# Patient Record
Sex: MALE | Race: WHITE
[De-identification: names, ages, dates, MRNs, and addresses within clinical notes are randomized per-mention and may not be internally consistent; named-entity substitution may affect disease eponyms.]

---

## 2017-05-23 ENCOUNTER — PRIOR ORIGINAL RECORDS (OUTPATIENT)
Dept: OTHER | Age: 77
End: 2017-05-23

## 2017-07-03 ENCOUNTER — OFFICE VISIT (OUTPATIENT)
Dept: INTERNAL MEDICINE CLINIC | Facility: CLINIC | Age: 77
End: 2017-07-03

## 2017-07-03 VITALS
BODY MASS INDEX: 27.05 KG/M2 | SYSTOLIC BLOOD PRESSURE: 138 MMHG | OXYGEN SATURATION: 96 % | HEART RATE: 81 BPM | RESPIRATION RATE: 17 BRPM | TEMPERATURE: 98 F | DIASTOLIC BLOOD PRESSURE: 76 MMHG | HEIGHT: 65 IN | WEIGHT: 162.38 LBS

## 2017-07-03 DIAGNOSIS — I10 HTN, GOAL BELOW 130/80: Primary | ICD-10-CM

## 2017-07-03 DIAGNOSIS — E78.5 DYSLIPIDEMIA: ICD-10-CM

## 2017-07-03 DIAGNOSIS — E78.2 MIXED HYPERLIPIDEMIA: ICD-10-CM

## 2017-07-03 DIAGNOSIS — K43.9 VENTRAL HERNIA WITHOUT OBSTRUCTION OR GANGRENE: ICD-10-CM

## 2017-07-03 PROCEDURE — 99204 OFFICE O/P NEW MOD 45 MIN: CPT | Performed by: INTERNAL MEDICINE

## 2017-07-03 RX ORDER — LOSARTAN POTASSIUM 100 MG/1
25 TABLET ORAL 2 TIMES DAILY
Qty: 60 TABLET | Refills: 6 | Status: SHIPPED | OUTPATIENT
Start: 2017-07-03 | End: 2017-08-07

## 2017-07-03 NOTE — PROGRESS NOTES
HPI:    Patient ID: Nohemi Mccracken is a 69year old male. HPIPt here for getting established. Has htn and  PVCs. Sees dr Jayjay Bloom. Has non obstructing CAD. Has a very large ventral hernia - no surgical opinion.     Review of Systems   Constitutional: Knute Pro edema. Neurological: He is alert and oriented to person, place, and time. Skin: No rash noted. No erythema.    Psychiatric:   forgetful              ASSESSMENT/PLAN:   Htn, goal below 130/80  (primary encounter diagnosis) controlled with with Losartan 1

## 2017-07-07 ENCOUNTER — NURSE ONLY (OUTPATIENT)
Dept: INTERNAL MEDICINE CLINIC | Facility: CLINIC | Age: 77
End: 2017-07-07

## 2017-07-07 DIAGNOSIS — K43.9 VENTRAL HERNIA WITHOUT OBSTRUCTION OR GANGRENE: ICD-10-CM

## 2017-07-07 DIAGNOSIS — E78.5 DYSLIPIDEMIA: ICD-10-CM

## 2017-07-07 DIAGNOSIS — I10 HTN, GOAL BELOW 130/80: ICD-10-CM

## 2017-07-07 LAB
ALBUMIN SERPL BCP-MCNC: 4 G/DL (ref 3.5–4.8)
ALBUMIN/GLOB SERPL: 1.7 {RATIO} (ref 1–2)
ALP SERPL-CCNC: 60 U/L (ref 32–100)
ALT SERPL-CCNC: 15 U/L (ref 17–63)
ANION GAP SERPL CALC-SCNC: 7 MMOL/L (ref 0–18)
AST SERPL-CCNC: 26 U/L (ref 15–41)
BASOPHILS # BLD: 0 K/UL (ref 0–0.2)
BASOPHILS NFR BLD: 1 %
BILIRUB SERPL-MCNC: 1.5 MG/DL (ref 0.3–1.2)
BUN SERPL-MCNC: 5 MG/DL (ref 8–20)
BUN/CREAT SERPL: 7 (ref 10–20)
CALCIUM SERPL-MCNC: 8.9 MG/DL (ref 8.5–10.5)
CHLORIDE SERPL-SCNC: 98 MMOL/L (ref 95–110)
CHOLEST SERPL-MCNC: 88 MG/DL (ref 110–200)
CO2 SERPL-SCNC: 25 MMOL/L (ref 22–32)
CREAT SERPL-MCNC: 0.71 MG/DL (ref 0.5–1.5)
EOSINOPHIL # BLD: 0.2 K/UL (ref 0–0.7)
EOSINOPHIL NFR BLD: 6 %
ERYTHROCYTE [DISTWIDTH] IN BLOOD BY AUTOMATED COUNT: 15.1 % (ref 11–15)
GLOBULIN PLAS-MCNC: 2.4 G/DL (ref 2.5–3.7)
GLUCOSE SERPL-MCNC: 95 MG/DL (ref 70–99)
HCT VFR BLD AUTO: 37.4 % (ref 41–52)
HDLC SERPL-MCNC: 39 MG/DL
HGB BLD-MCNC: 13.2 G/DL (ref 13.5–17.5)
LDLC SERPL CALC-MCNC: 41 MG/DL (ref 0–99)
LYMPHOCYTES # BLD: 1.6 K/UL (ref 1–4)
LYMPHOCYTES NFR BLD: 40 %
MCH RBC QN AUTO: 29.9 PG (ref 27–32)
MCHC RBC AUTO-ENTMCNC: 35.2 G/DL (ref 32–37)
MCV RBC AUTO: 84.9 FL (ref 80–100)
MONOCYTES # BLD: 1.1 K/UL (ref 0–1)
MONOCYTES NFR BLD: 27 %
NEUTROPHILS # BLD AUTO: 1 K/UL (ref 1.8–7.7)
NEUTROPHILS NFR BLD: 25 %
NONHDLC SERPL-MCNC: 49 MG/DL
OSMOLALITY UR CALC.SUM OF ELEC: 267 MOSM/KG (ref 275–295)
PLATELET # BLD AUTO: 137 K/UL (ref 140–400)
PMV BLD AUTO: 10.7 FL (ref 7.4–10.3)
POTASSIUM SERPL-SCNC: 4.9 MMOL/L (ref 3.3–5.1)
PROT SERPL-MCNC: 6.4 G/DL (ref 5.9–8.4)
RBC # BLD AUTO: 4.41 M/UL (ref 4.5–5.9)
SODIUM SERPL-SCNC: 130 MMOL/L (ref 136–144)
TRIGL SERPL-MCNC: 42 MG/DL (ref 1–149)
TSH SERPL-ACNC: 2.32 UIU/ML (ref 0.45–5.33)
WBC # BLD AUTO: 4 K/UL (ref 4–11)

## 2017-07-07 PROCEDURE — 80053 COMPREHEN METABOLIC PANEL: CPT | Performed by: INTERNAL MEDICINE

## 2017-07-07 PROCEDURE — 84443 ASSAY THYROID STIM HORMONE: CPT | Performed by: INTERNAL MEDICINE

## 2017-07-07 PROCEDURE — 85025 COMPLETE CBC W/AUTO DIFF WBC: CPT | Performed by: INTERNAL MEDICINE

## 2017-07-07 PROCEDURE — 80061 LIPID PANEL: CPT | Performed by: INTERNAL MEDICINE

## 2017-07-07 PROCEDURE — 36415 COLL VENOUS BLD VENIPUNCTURE: CPT | Performed by: INTERNAL MEDICINE

## 2017-07-07 NOTE — PROGRESS NOTES
Pt presented to clinic today for blood draw. Per physician able to draw orders. Orders  documented within chart. Pt tolerated lab draw well.  verified.   Orders drawn include: cbc, cmp, lipid, tsh   Site of draw: rt arm   Gareth Courts, [facility]

## 2017-07-18 ENCOUNTER — LAB ENCOUNTER (OUTPATIENT)
Dept: LAB | Age: 77
End: 2017-07-18
Attending: INTERNAL MEDICINE
Payer: MEDICARE

## 2017-07-18 DIAGNOSIS — E78.2 MIXED HYPERLIPIDEMIA: Primary | ICD-10-CM

## 2017-07-18 DIAGNOSIS — I10 ESSENTIAL HYPERTENSION, MALIGNANT: ICD-10-CM

## 2017-07-18 LAB
ALT SERPL-CCNC: 15 U/L (ref 17–63)
ANION GAP SERPL CALC-SCNC: 8 MMOL/L (ref 0–18)
AST SERPL-CCNC: 21 U/L (ref 15–41)
BUN SERPL-MCNC: 10 MG/DL (ref 8–20)
BUN/CREAT SERPL: 15.6 (ref 10–20)
CALCIUM SERPL-MCNC: 8.6 MG/DL (ref 8.5–10.5)
CHLORIDE SERPL-SCNC: 100 MMOL/L (ref 95–110)
CHOLEST SERPL-MCNC: 86 MG/DL (ref 110–200)
CO2 SERPL-SCNC: 24 MMOL/L (ref 22–32)
CREAT SERPL-MCNC: 0.64 MG/DL (ref 0.5–1.5)
GLUCOSE SERPL-MCNC: 100 MG/DL (ref 70–99)
HDLC SERPL-MCNC: 41 MG/DL
LDLC SERPL CALC-MCNC: 37 MG/DL (ref 0–99)
NONHDLC SERPL-MCNC: 45 MG/DL
OSMOLALITY UR CALC.SUM OF ELEC: 273 MOSM/KG (ref 275–295)
POTASSIUM SERPL-SCNC: 4.2 MMOL/L (ref 3.3–5.1)
SODIUM SERPL-SCNC: 132 MMOL/L (ref 136–144)
TRIGL SERPL-MCNC: 41 MG/DL (ref 1–149)

## 2017-07-18 PROCEDURE — 84460 ALANINE AMINO (ALT) (SGPT): CPT

## 2017-07-18 PROCEDURE — 36415 COLL VENOUS BLD VENIPUNCTURE: CPT

## 2017-07-18 PROCEDURE — 80061 LIPID PANEL: CPT

## 2017-07-18 PROCEDURE — 84450 TRANSFERASE (AST) (SGOT): CPT

## 2017-07-18 PROCEDURE — 80048 BASIC METABOLIC PNL TOTAL CA: CPT

## 2017-07-19 ENCOUNTER — PRIOR ORIGINAL RECORDS (OUTPATIENT)
Dept: OTHER | Age: 77
End: 2017-07-19

## 2017-07-19 LAB
ALT (SGPT): 15 U/L
AST (SGOT): 21 U/L
BUN: 10 MG/DL
CALCIUM: 21 MG/DL
CHLORIDE: 100 MEQ/L
CHOLESTEROL, TOTAL: 86 MG/DL
CREATININE, SERUM: 0.64 MG/DL
GLUCOSE: 100 MG/DL
GLUCOSE: 100 MG/DL
HDL CHOLESTEROL: 41 MG/DL
LDL CHOLESTEROL: 37 MG/DL
POTASSIUM, SERUM: 4.2 MEQ/L
SGOT (AST): 21 IU/L
SGPT (ALT): 15 IU/L
SODIUM: 132 MEQ/L
TRIGLYCERIDES: 41 MG/DL

## 2017-08-07 ENCOUNTER — PRIOR ORIGINAL RECORDS (OUTPATIENT)
Dept: OTHER | Age: 77
End: 2017-08-07

## 2017-08-07 RX ORDER — LOSARTAN POTASSIUM 100 MG/1
25 TABLET ORAL 2 TIMES DAILY
Qty: 60 TABLET | Refills: 6 | Status: ON HOLD | OUTPATIENT
Start: 2017-08-07 | End: 2018-01-01

## 2017-08-14 ENCOUNTER — PRIOR ORIGINAL RECORDS (OUTPATIENT)
Dept: OTHER | Age: 77
End: 2017-08-14

## 2017-08-15 ENCOUNTER — HOSPITAL ENCOUNTER (OUTPATIENT)
Dept: CV DIAGNOSTICS | Facility: HOSPITAL | Age: 77
Discharge: HOME | End: 2017-08-15
Attending: INTERNAL MEDICINE
Payer: MEDICARE

## 2017-08-15 ENCOUNTER — HOSPITAL ENCOUNTER (OUTPATIENT)
Dept: NUCLEAR MEDICINE | Facility: HOSPITAL | Age: 77
Discharge: HOME | End: 2017-08-15
Attending: INTERNAL MEDICINE
Payer: MEDICARE

## 2017-08-15 DIAGNOSIS — I25.10 CORONARY ATHEROSCLEROSIS OF NATIVE CORONARY ARTERY: ICD-10-CM

## 2017-08-15 DIAGNOSIS — I49.1 SUPRAVENTRICULAR PREMATURE BEATS: ICD-10-CM

## 2017-08-15 DIAGNOSIS — I49.3 VENTRICULAR PREMATURE BEATS: ICD-10-CM

## 2017-08-15 PROCEDURE — 93017 CV STRESS TEST TRACING ONLY: CPT | Performed by: INTERNAL MEDICINE

## 2017-08-15 PROCEDURE — 93016 CV STRESS TEST SUPVJ ONLY: CPT | Performed by: INTERNAL MEDICINE

## 2017-08-15 PROCEDURE — 78452 HT MUSCLE IMAGE SPECT MULT: CPT | Performed by: INTERNAL MEDICINE

## 2017-08-15 PROCEDURE — 93227 XTRNL ECG REC<48 HR R&I: CPT | Performed by: INTERNAL MEDICINE

## 2017-08-15 PROCEDURE — 93018 CV STRESS TEST I&R ONLY: CPT | Performed by: INTERNAL MEDICINE

## 2017-08-15 RX ORDER — SODIUM CHLORIDE 9 MG/ML
INJECTION, SOLUTION INTRAVENOUS
Status: DISPENSED
Start: 2017-08-15 | End: 2017-08-16

## 2017-08-15 NOTE — IMAGING NOTE
Pt here for a mod. Yehuda stress test as ordered by Dr Brian Sherman. Upon obtaining baseline EKG's, Pt had 5 beats of non-sustained VT and freq. Multifocal PVC's at rest. Dr Ferrera Rater, notified and test was changed to a regadenoson instead.  Pt is asymptomatic and den

## 2017-08-16 ENCOUNTER — HOSPITAL ENCOUNTER (OUTPATIENT)
Dept: CV DIAGNOSTICS | Facility: HOSPITAL | Age: 77
Discharge: HOME | End: 2017-08-16
Attending: INTERNAL MEDICINE
Payer: MEDICARE

## 2017-08-18 ENCOUNTER — PRIOR ORIGINAL RECORDS (OUTPATIENT)
Dept: OTHER | Age: 77
End: 2017-08-18

## 2017-08-24 ENCOUNTER — PRIOR ORIGINAL RECORDS (OUTPATIENT)
Dept: OTHER | Age: 77
End: 2017-08-24

## 2017-09-05 ENCOUNTER — PRIOR ORIGINAL RECORDS (OUTPATIENT)
Dept: OTHER | Age: 77
End: 2017-09-05

## 2018-01-01 ENCOUNTER — HOSPITAL ENCOUNTER (OUTPATIENT)
Dept: GENERAL RADIOLOGY | Facility: HOSPITAL | Age: 78
Discharge: HOME | End: 2018-01-01
Attending: SPECIALIST
Payer: COMMERCIAL

## 2018-01-01 ENCOUNTER — APPOINTMENT (OUTPATIENT)
Dept: GENERAL RADIOLOGY | Facility: HOSPITAL | Age: 78
DRG: 178 | End: 2018-01-01
Attending: HOSPITALIST
Payer: MEDICARE

## 2018-01-01 ENCOUNTER — APPOINTMENT (OUTPATIENT)
Dept: CV DIAGNOSTICS | Facility: HOSPITAL | Age: 78
DRG: 178 | End: 2018-01-01
Attending: HOSPITALIST
Payer: MEDICARE

## 2018-01-01 ENCOUNTER — SNF VISIT (OUTPATIENT)
Dept: INTERNAL MEDICINE CLINIC | Facility: SKILLED NURSING FACILITY | Age: 78
End: 2018-01-01

## 2018-01-01 ENCOUNTER — SNF/IP PROF CHARGE ONLY (OUTPATIENT)
Dept: INTERNAL MEDICINE CLINIC | Facility: CLINIC | Age: 78
End: 2018-01-01

## 2018-01-01 ENCOUNTER — APPOINTMENT (OUTPATIENT)
Dept: GENERAL RADIOLOGY | Facility: HOSPITAL | Age: 78
DRG: 563 | End: 2018-01-01
Attending: INTERNAL MEDICINE
Payer: MEDICARE

## 2018-01-01 ENCOUNTER — APPOINTMENT (OUTPATIENT)
Dept: CT IMAGING | Facility: HOSPITAL | Age: 78
DRG: 563 | End: 2018-01-01
Attending: HOSPITALIST
Payer: MEDICARE

## 2018-01-01 ENCOUNTER — APPOINTMENT (OUTPATIENT)
Dept: GENERAL RADIOLOGY | Facility: HOSPITAL | Age: 78
DRG: 178 | End: 2018-01-01
Attending: EMERGENCY MEDICINE
Payer: MEDICARE

## 2018-01-01 ENCOUNTER — APPOINTMENT (OUTPATIENT)
Dept: GENERAL RADIOLOGY | Facility: HOSPITAL | Age: 78
DRG: 563 | End: 2018-01-01
Attending: EMERGENCY MEDICINE
Payer: MEDICARE

## 2018-01-01 ENCOUNTER — APPOINTMENT (OUTPATIENT)
Dept: ULTRASOUND IMAGING | Facility: HOSPITAL | Age: 78
DRG: 178 | End: 2018-01-01
Attending: INTERNAL MEDICINE
Payer: MEDICARE

## 2018-01-01 ENCOUNTER — HOSPITAL ENCOUNTER (OUTPATIENT)
Dept: CT IMAGING | Facility: HOSPITAL | Age: 78
Discharge: HOME | End: 2018-01-01
Attending: INTERNAL MEDICINE
Payer: MEDICARE

## 2018-01-01 ENCOUNTER — APPOINTMENT (OUTPATIENT)
Dept: CT IMAGING | Facility: HOSPITAL | Age: 78
DRG: 563 | End: 2018-01-01
Attending: EMERGENCY MEDICINE
Payer: MEDICARE

## 2018-01-01 ENCOUNTER — TELEPHONE (OUTPATIENT)
Dept: INTERNAL MEDICINE CLINIC | Facility: CLINIC | Age: 78
End: 2018-01-01

## 2018-01-01 ENCOUNTER — HOSPITAL ENCOUNTER (INPATIENT)
Facility: HOSPITAL | Age: 78
LOS: 8 days | Discharge: NURSING, REHAB OR LONG-TERM CARE | DRG: 178 | End: 2018-01-01
Attending: EMERGENCY MEDICINE | Admitting: HOSPITALIST
Payer: MEDICARE

## 2018-01-01 ENCOUNTER — HOSPITAL ENCOUNTER (INPATIENT)
Facility: HOSPITAL | Age: 78
LOS: 9 days | Discharge: NURSING, REHAB OR LONG-TERM CARE | DRG: 563 | End: 2018-01-01
Attending: EMERGENCY MEDICINE | Admitting: INTERNAL MEDICINE
Payer: MEDICARE

## 2018-01-01 VITALS
WEIGHT: 155.63 LBS | DIASTOLIC BLOOD PRESSURE: 55 MMHG | HEART RATE: 94 BPM | SYSTOLIC BLOOD PRESSURE: 111 MMHG | RESPIRATION RATE: 20 BRPM | TEMPERATURE: 97 F | BODY MASS INDEX: 24 KG/M2 | OXYGEN SATURATION: 97 %

## 2018-01-01 VITALS
BODY MASS INDEX: 24 KG/M2 | SYSTOLIC BLOOD PRESSURE: 97 MMHG | TEMPERATURE: 98 F | HEART RATE: 106 BPM | WEIGHT: 155.63 LBS | RESPIRATION RATE: 20 BRPM | OXYGEN SATURATION: 97 % | DIASTOLIC BLOOD PRESSURE: 56 MMHG

## 2018-01-01 VITALS
TEMPERATURE: 98 F | RESPIRATION RATE: 20 BRPM | HEART RATE: 73 BPM | WEIGHT: 155.63 LBS | OXYGEN SATURATION: 97 % | BODY MASS INDEX: 24 KG/M2 | SYSTOLIC BLOOD PRESSURE: 129 MMHG | DIASTOLIC BLOOD PRESSURE: 74 MMHG

## 2018-01-01 VITALS
WEIGHT: 161.31 LBS | HEART RATE: 79 BPM | DIASTOLIC BLOOD PRESSURE: 60 MMHG | BODY MASS INDEX: 25.32 KG/M2 | SYSTOLIC BLOOD PRESSURE: 117 MMHG | RESPIRATION RATE: 18 BRPM | TEMPERATURE: 99 F | OXYGEN SATURATION: 96 % | HEIGHT: 67 IN

## 2018-01-01 VITALS
SYSTOLIC BLOOD PRESSURE: 130 MMHG | DIASTOLIC BLOOD PRESSURE: 65 MMHG | RESPIRATION RATE: 20 BRPM | WEIGHT: 158 LBS | BODY MASS INDEX: 25 KG/M2 | OXYGEN SATURATION: 97 % | HEART RATE: 94 BPM | TEMPERATURE: 98 F

## 2018-01-01 VITALS
HEART RATE: 110 BPM | WEIGHT: 155 LBS | OXYGEN SATURATION: 97 % | BODY MASS INDEX: 24 KG/M2 | RESPIRATION RATE: 20 BRPM | DIASTOLIC BLOOD PRESSURE: 65 MMHG | SYSTOLIC BLOOD PRESSURE: 97 MMHG | TEMPERATURE: 97 F

## 2018-01-01 VITALS
SYSTOLIC BLOOD PRESSURE: 92 MMHG | HEART RATE: 96 BPM | RESPIRATION RATE: 16 BRPM | TEMPERATURE: 97 F | DIASTOLIC BLOOD PRESSURE: 53 MMHG

## 2018-01-01 VITALS
WEIGHT: 155.63 LBS | DIASTOLIC BLOOD PRESSURE: 72 MMHG | SYSTOLIC BLOOD PRESSURE: 151 MMHG | RESPIRATION RATE: 20 BRPM | TEMPERATURE: 98 F | HEART RATE: 82 BPM | OXYGEN SATURATION: 97 % | BODY MASS INDEX: 24 KG/M2

## 2018-01-01 VITALS
SYSTOLIC BLOOD PRESSURE: 106 MMHG | HEIGHT: 66 IN | OXYGEN SATURATION: 96 % | TEMPERATURE: 98 F | WEIGHT: 150.88 LBS | RESPIRATION RATE: 18 BRPM | DIASTOLIC BLOOD PRESSURE: 51 MMHG | HEART RATE: 94 BPM | BODY MASS INDEX: 24.25 KG/M2

## 2018-01-01 VITALS
DIASTOLIC BLOOD PRESSURE: 66 MMHG | TEMPERATURE: 97 F | BODY MASS INDEX: 24 KG/M2 | RESPIRATION RATE: 20 BRPM | HEART RATE: 110 BPM | OXYGEN SATURATION: 97 % | WEIGHT: 155 LBS | SYSTOLIC BLOOD PRESSURE: 93 MMHG

## 2018-01-01 VITALS
SYSTOLIC BLOOD PRESSURE: 125 MMHG | HEART RATE: 74 BPM | OXYGEN SATURATION: 97 % | RESPIRATION RATE: 20 BRPM | DIASTOLIC BLOOD PRESSURE: 65 MMHG | WEIGHT: 185.63 LBS | BODY MASS INDEX: 29 KG/M2 | TEMPERATURE: 98 F

## 2018-01-01 DIAGNOSIS — R07.89 PRESSURE IN CHEST: ICD-10-CM

## 2018-01-01 DIAGNOSIS — S00.83XA CONTUSION OF FACE, INITIAL ENCOUNTER: ICD-10-CM

## 2018-01-01 DIAGNOSIS — S42.211A CLOSED DISPLACED FRACTURE OF SURGICAL NECK OF RIGHT HUMERUS, UNSPECIFIED FRACTURE MORPHOLOGY, INITIAL ENCOUNTER: Primary | ICD-10-CM

## 2018-01-01 DIAGNOSIS — E87.1 HYPONATREMIA: ICD-10-CM

## 2018-01-01 DIAGNOSIS — R26.2 DIFFICULTY WALKING: ICD-10-CM

## 2018-01-01 DIAGNOSIS — R06.02 SOB (SHORTNESS OF BREATH): ICD-10-CM

## 2018-01-01 DIAGNOSIS — E44.1 MILD PROTEIN-CALORIE MALNUTRITION (HCC): Primary | ICD-10-CM

## 2018-01-01 DIAGNOSIS — S00.83XD CONTUSION OF FACE, SUBSEQUENT ENCOUNTER: ICD-10-CM

## 2018-01-01 DIAGNOSIS — S42.211A CLOSED DISPLACED FRACTURE OF SURGICAL NECK OF RIGHT HUMERUS, UNSPECIFIED FRACTURE MORPHOLOGY, INITIAL ENCOUNTER: ICD-10-CM

## 2018-01-01 DIAGNOSIS — R53.1 GENERALIZED WEAKNESS: ICD-10-CM

## 2018-01-01 DIAGNOSIS — S00.81XD ABRASION OF FACE, SUBSEQUENT ENCOUNTER: ICD-10-CM

## 2018-01-01 DIAGNOSIS — S42.211D CLOSED DISPLACED FRACTURE OF SURGICAL NECK OF RIGHT HUMERUS WITH ROUTINE HEALING, UNSPECIFIED FRACTURE MORPHOLOGY, SUBSEQUENT ENCOUNTER: ICD-10-CM

## 2018-01-01 DIAGNOSIS — I77.810 DILATED AORTIC ROOT (HCC): ICD-10-CM

## 2018-01-01 DIAGNOSIS — F41.9 ANXIETY: ICD-10-CM

## 2018-01-01 DIAGNOSIS — R55 SYNCOPE AND COLLAPSE: ICD-10-CM

## 2018-01-01 DIAGNOSIS — R53.1 WEAKNESS: ICD-10-CM

## 2018-01-01 DIAGNOSIS — S09.8XXA BLUNT HEAD INJURY, INITIAL ENCOUNTER: ICD-10-CM

## 2018-01-01 DIAGNOSIS — R41.82 ALTERED MENTAL STATUS, UNSPECIFIED ALTERED MENTAL STATUS TYPE: ICD-10-CM

## 2018-01-01 DIAGNOSIS — R60.0 LOCALIZED EDEMA: ICD-10-CM

## 2018-01-01 DIAGNOSIS — R26.2 UNABLE TO WALK: ICD-10-CM

## 2018-01-01 DIAGNOSIS — S00.81XA ABRASION OF FACE, INITIAL ENCOUNTER: ICD-10-CM

## 2018-01-01 DIAGNOSIS — R60.9 EDEMA, UNSPECIFIED TYPE: ICD-10-CM

## 2018-01-01 DIAGNOSIS — Z02.9 DISCHARGE PLANNING ISSUES: ICD-10-CM

## 2018-01-01 DIAGNOSIS — M25.562 LEFT KNEE PAIN: ICD-10-CM

## 2018-01-01 DIAGNOSIS — F32.A DEPRESSION, UNSPECIFIED DEPRESSION TYPE: ICD-10-CM

## 2018-01-01 DIAGNOSIS — S42.309A HUMERUS FRACTURE: ICD-10-CM

## 2018-01-01 DIAGNOSIS — M25.561 RIGHT KNEE PAIN: ICD-10-CM

## 2018-01-01 DIAGNOSIS — R29.898 LEFT ARM WEAKNESS: ICD-10-CM

## 2018-01-01 DIAGNOSIS — E46 PROTEIN-CALORIE MALNUTRITION, UNSPECIFIED SEVERITY (HCC): Primary | ICD-10-CM

## 2018-01-01 DIAGNOSIS — I95.9 HYPOTENSION, UNSPECIFIED HYPOTENSION TYPE: ICD-10-CM

## 2018-01-01 DIAGNOSIS — S42.411A RIGHT SUPRACONDYLAR HUMERUS FRACTURE: ICD-10-CM

## 2018-01-01 DIAGNOSIS — Z86.718 HX OF BLOOD CLOTS: ICD-10-CM

## 2018-01-01 DIAGNOSIS — R94.31 ABNORMAL EKG: ICD-10-CM

## 2018-01-01 DIAGNOSIS — J18.9 HOSPITAL-ACQUIRED PNEUMONIA: Primary | ICD-10-CM

## 2018-01-01 DIAGNOSIS — E87.5 HYPERKALEMIA: ICD-10-CM

## 2018-01-01 DIAGNOSIS — R53.1 GENERAL WEAKNESS: ICD-10-CM
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
## 2018-01-01 DIAGNOSIS — D68.9 COAGULOPATHY (HCC): ICD-10-CM

## 2018-01-01 DIAGNOSIS — D64.9 ANEMIA, UNSPECIFIED TYPE: ICD-10-CM

## 2018-01-01 DIAGNOSIS — E44.1 MILD PROTEIN-CALORIE MALNUTRITION (HCC): ICD-10-CM

## 2018-01-01 LAB
ALBUMIN SERPL BCP-MCNC: 2.2 G/DL (ref 3.5–4.8)
ALBUMIN SERPL BCP-MCNC: 2.2 G/DL (ref 3.5–4.8)
ALBUMIN SERPL BCP-MCNC: 2.5 G/DL (ref 3.5–4.8)
ALBUMIN SERPL BCP-MCNC: 2.6 G/DL (ref 3.5–4.8)
ALBUMIN/GLOB SERPL: 1.1 {RATIO} (ref 1–2)
ALP SERPL-CCNC: 64 U/L (ref 32–100)
ALT SERPL-CCNC: 13 U/L (ref 17–63)
ANION GAP SERPL CALC-SCNC: 10 MMOL/L (ref 0–18)
ANION GAP SERPL CALC-SCNC: 2 MMOL/L (ref 0–18)
ANION GAP SERPL CALC-SCNC: 2 MMOL/L (ref 0–18)
ANION GAP SERPL CALC-SCNC: 3 MMOL/L (ref 0–18)
ANION GAP SERPL CALC-SCNC: 3 MMOL/L (ref 0–18)
ANION GAP SERPL CALC-SCNC: 4 MMOL/L (ref 0–18)
ANION GAP SERPL CALC-SCNC: 5 MMOL/L (ref 0–18)
ANION GAP SERPL CALC-SCNC: 6 MMOL/L (ref 0–18)
ANION GAP SERPL CALC-SCNC: 7 MMOL/L (ref 0–18)
ANION GAP SERPL CALC-SCNC: 7 MMOL/L (ref 0–18)
ANION GAP SERPL CALC-SCNC: 9 MMOL/L (ref 0–18)
ANTIBODY SCREEN: NEGATIVE
APTT PPP: 40.8 SECONDS (ref 23.2–35.3)
APTT PPP: 56.3 SECONDS (ref 23.2–35.3)
APTT PPP: 71.3 SECONDS (ref 23.2–35.3)
AST SERPL-CCNC: 20 U/L (ref 15–41)
BASOPHILS # BLD: 0 K/UL (ref 0–0.2)
BASOPHILS # BLD: 0.1 K/UL (ref 0–0.2)
BASOPHILS # BLD: 0.2 K/UL (ref 0–0.2)
BASOPHILS # BLD: 0.2 K/UL (ref 0–0.2)
BASOPHILS # BLD: 0.3 K/UL (ref 0–0.2)
BASOPHILS NFR BLD: 0 %
BASOPHILS NFR BLD: 1 %
BASOPHILS NFR BLD: 2 %
BASOPHILS NFR BLD: 3 %
BASOPHILS NFR BLD: 4 %
BASOPHILS NFR BLD: 6 %
BILIRUB SERPL-MCNC: 1.1 MG/DL (ref 0.3–1.2)
BILIRUB UR QL: NEGATIVE
BILIRUB UR QL: NEGATIVE
BLOOD TYPE BARCODE: 6200
BNP SERPL-MCNC: 216 PG/ML (ref 0–100)
BNP SERPL-MCNC: 433 PG/ML (ref 0–100)
BUN SERPL-MCNC: 11 MG/DL (ref 8–20)
BUN SERPL-MCNC: 11 MG/DL (ref 8–20)
BUN SERPL-MCNC: 13 MG/DL (ref 8–20)
BUN SERPL-MCNC: 14 MG/DL (ref 8–20)
BUN SERPL-MCNC: 14 MG/DL (ref 8–20)
BUN SERPL-MCNC: 15 MG/DL (ref 8–20)
BUN SERPL-MCNC: 16 MG/DL (ref 8–20)
BUN SERPL-MCNC: 18 MG/DL (ref 8–20)
BUN SERPL-MCNC: 18 MG/DL (ref 8–20)
BUN SERPL-MCNC: 21 MG/DL (ref 8–20)
BUN SERPL-MCNC: 23 MG/DL (ref 8–20)
BUN SERPL-MCNC: 24 MG/DL (ref 8–20)
BUN SERPL-MCNC: 25 MG/DL (ref 8–20)
BUN SERPL-MCNC: 26 MG/DL (ref 8–20)
BUN SERPL-MCNC: 26 MG/DL (ref 8–20)
BUN SERPL-MCNC: 27 MG/DL (ref 8–20)
BUN SERPL-MCNC: 28 MG/DL (ref 8–20)
BUN SERPL-MCNC: 32 MG/DL (ref 8–20)
BUN/CREAT SERPL: 15.5 (ref 10–20)
BUN/CREAT SERPL: 16.4 (ref 10–20)
BUN/CREAT SERPL: 18.7 (ref 10–20)
BUN/CREAT SERPL: 19.8 (ref 10–20)
BUN/CREAT SERPL: 21.3 (ref 10–20)
BUN/CREAT SERPL: 21.5 (ref 10–20)
BUN/CREAT SERPL: 21.7 (ref 10–20)
BUN/CREAT SERPL: 21.7 (ref 10–20)
BUN/CREAT SERPL: 21.9 (ref 10–20)
BUN/CREAT SERPL: 22.4 (ref 10–20)
BUN/CREAT SERPL: 23.1 (ref 10–20)
BUN/CREAT SERPL: 23.5 (ref 10–20)
BUN/CREAT SERPL: 24.3 (ref 10–20)
BUN/CREAT SERPL: 24.3 (ref 10–20)
BUN/CREAT SERPL: 24.6 (ref 10–20)
BUN/CREAT SERPL: 27.6 (ref 10–20)
BUN/CREAT SERPL: 27.7 (ref 10–20)
BUN/CREAT SERPL: 28.3 (ref 10–20)
BUN/CREAT SERPL: 28.6 (ref 10–20)
BUN/CREAT SERPL: 29.3 (ref 10–20)
BUN/CREAT SERPL: 29.5 (ref 10–20)
BUN/CREAT SERPL: 30.8 (ref 10–20)
BUN/CREAT SERPL: 32.4 (ref 10–20)
CALCIUM SERPL-MCNC: 7.6 MG/DL (ref 8.5–10.5)
CALCIUM SERPL-MCNC: 7.7 MG/DL (ref 8.5–10.5)
CALCIUM SERPL-MCNC: 7.8 MG/DL (ref 8.5–10.5)
CALCIUM SERPL-MCNC: 8 MG/DL (ref 8.5–10.5)
CALCIUM SERPL-MCNC: 8.1 MG/DL (ref 8.5–10.5)
CALCIUM SERPL-MCNC: 8.4 MG/DL (ref 8.5–10.5)
CALCIUM SERPL-MCNC: 8.4 MG/DL (ref 8.5–10.5)
CALCIUM SERPL-MCNC: 8.5 MG/DL (ref 8.5–10.5)
CHLORIDE SERPL-SCNC: 100 MMOL/L (ref 95–110)
CHLORIDE SERPL-SCNC: 101 MMOL/L (ref 95–110)
CHLORIDE SERPL-SCNC: 102 MMOL/L (ref 95–110)
CHLORIDE SERPL-SCNC: 103 MMOL/L (ref 95–110)
CHLORIDE SERPL-SCNC: 103 MMOL/L (ref 95–110)
CHLORIDE SERPL-SCNC: 104 MMOL/L (ref 95–110)
CHLORIDE SERPL-SCNC: 97 MMOL/L (ref 95–110)
CHLORIDE SERPL-SCNC: 98 MMOL/L (ref 95–110)
CHLORIDE SERPL-SCNC: 98 MMOL/L (ref 95–110)
CHLORIDE SERPL-SCNC: 99 MMOL/L (ref 95–110)
CK SERPL-CCNC: 32 U/L (ref 49–397)
CO2 SERPL-SCNC: 16 MMOL/L (ref 22–32)
CO2 SERPL-SCNC: 18 MMOL/L (ref 22–32)
CO2 SERPL-SCNC: 20 MMOL/L (ref 22–32)
CO2 SERPL-SCNC: 20 MMOL/L (ref 22–32)
CO2 SERPL-SCNC: 21 MMOL/L (ref 22–32)
CO2 SERPL-SCNC: 22 MMOL/L (ref 22–32)
CO2 SERPL-SCNC: 23 MMOL/L (ref 22–32)
CO2 SERPL-SCNC: 23 MMOL/L (ref 22–32)
CO2 SERPL-SCNC: 24 MMOL/L (ref 22–32)
CO2 SERPL-SCNC: 25 MMOL/L (ref 22–32)
CO2 SERPL-SCNC: 26 MMOL/L (ref 22–32)
COLOR UR: YELLOW
COLOR UR: YELLOW
CORTIS SERPL-MCNC: 16.8 MCG/DL
CREAT SERPL-MCNC: 0.6 MG/DL (ref 0.5–1.5)
CREAT SERPL-MCNC: 0.61 MG/DL (ref 0.5–1.5)
CREAT SERPL-MCNC: 0.65 MG/DL (ref 0.5–1.5)
CREAT SERPL-MCNC: 0.67 MG/DL (ref 0.5–1.5)
CREAT SERPL-MCNC: 0.68 MG/DL (ref 0.5–1.5)
CREAT SERPL-MCNC: 0.71 MG/DL (ref 0.5–1.5)
CREAT SERPL-MCNC: 0.73 MG/DL (ref 0.5–1.5)
CREAT SERPL-MCNC: 0.74 MG/DL (ref 0.5–1.5)
CREAT SERPL-MCNC: 0.75 MG/DL (ref 0.5–1.5)
CREAT SERPL-MCNC: 0.76 MG/DL (ref 0.5–1.5)
CREAT SERPL-MCNC: 0.81 MG/DL (ref 0.5–1.5)
CREAT SERPL-MCNC: 0.91 MG/DL (ref 0.5–1.5)
CREAT SERPL-MCNC: 0.92 MG/DL (ref 0.5–1.5)
CREAT SERPL-MCNC: 0.99 MG/DL (ref 0.5–1.5)
CREAT SERPL-MCNC: 1.03 MG/DL (ref 0.5–1.5)
CREAT SERPL-MCNC: 1.08 MG/DL (ref 0.5–1.5)
CREAT SERPL-MCNC: 1.12 MG/DL (ref 0.5–1.5)
CREAT SERPL-MCNC: 1.15 MG/DL (ref 0.5–1.5)
CREAT SERPL-MCNC: 1.16 MG/DL (ref 0.5–1.5)
CREAT SERPL-MCNC: 1.2 MG/DL (ref 0.5–1.5)
D DIMER PPP FEU-MCNC: 1.79 MCG/ML (ref ?–0.77)
EOSINOPHIL # BLD: 0 K/UL (ref 0–0.7)
EOSINOPHIL # BLD: 0 K/UL (ref 0–0.7)
EOSINOPHIL # BLD: 0.1 K/UL (ref 0–0.7)
EOSINOPHIL # BLD: 0.2 K/UL (ref 0–0.7)
EOSINOPHIL # BLD: 0.3 K/UL (ref 0–0.7)
EOSINOPHIL # BLD: 0.4 K/UL (ref 0–0.7)
EOSINOPHIL NFR BLD: 0 %
EOSINOPHIL NFR BLD: 0 %
EOSINOPHIL NFR BLD: 1 %
EOSINOPHIL NFR BLD: 2 %
EOSINOPHIL NFR BLD: 3 %
EOSINOPHIL NFR BLD: 3 %
EOSINOPHIL NFR BLD: 4 %
EOSINOPHIL NFR BLD: 5 %
EOSINOPHIL NFR BLD: 5 %
EOSINOPHIL NFR BLD: 7 %
ERYTHROCYTE [DISTWIDTH] IN BLOOD BY AUTOMATED COUNT: 15.1 % (ref 11–15)
ERYTHROCYTE [DISTWIDTH] IN BLOOD BY AUTOMATED COUNT: 15.2 % (ref 11–15)
ERYTHROCYTE [DISTWIDTH] IN BLOOD BY AUTOMATED COUNT: 15.3 % (ref 11–15)
ERYTHROCYTE [DISTWIDTH] IN BLOOD BY AUTOMATED COUNT: 15.4 % (ref 11–15)
ERYTHROCYTE [DISTWIDTH] IN BLOOD BY AUTOMATED COUNT: 15.4 % (ref 11–15)
ERYTHROCYTE [DISTWIDTH] IN BLOOD BY AUTOMATED COUNT: 15.5 % (ref 11–15)
ERYTHROCYTE [DISTWIDTH] IN BLOOD BY AUTOMATED COUNT: 15.6 % (ref 11–15)
ERYTHROCYTE [DISTWIDTH] IN BLOOD BY AUTOMATED COUNT: 15.7 % (ref 11–15)
ERYTHROCYTE [DISTWIDTH] IN BLOOD BY AUTOMATED COUNT: 16.5 % (ref 11–15)
ERYTHROCYTE [DISTWIDTH] IN BLOOD BY AUTOMATED COUNT: 16.6 % (ref 11–15)
ERYTHROCYTE [DISTWIDTH] IN BLOOD BY AUTOMATED COUNT: 16.8 % (ref 11–15)
ERYTHROCYTE [DISTWIDTH] IN BLOOD BY AUTOMATED COUNT: 16.9 % (ref 11–15)
ERYTHROCYTE [DISTWIDTH] IN BLOOD BY AUTOMATED COUNT: 16.9 % (ref 11–15)
ERYTHROCYTE [DISTWIDTH] IN BLOOD BY AUTOMATED COUNT: 17 % (ref 11–15)
ERYTHROCYTE [DISTWIDTH] IN BLOOD BY AUTOMATED COUNT: 17 % (ref 11–15)
ERYTHROCYTE [DISTWIDTH] IN BLOOD BY AUTOMATED COUNT: 17.2 % (ref 11–15)
ERYTHROCYTE [DISTWIDTH] IN BLOOD BY AUTOMATED COUNT: 17.2 % (ref 11–15)
ETHANOL SERPL-MCNC: 176 MG/DL
FERRITIN SERPL IA-MCNC: 950 NG/ML (ref 24–336)
GLOBULIN PLAS-MCNC: 2.4 G/DL (ref 2.5–3.7)
GLUCOSE BLDC GLUCOMTR-MCNC: 121 MG/DL (ref 70–99)
GLUCOSE BLDC GLUCOMTR-MCNC: 90 MG/DL (ref 70–99)
GLUCOSE SERPL-MCNC: 103 MG/DL (ref 70–99)
GLUCOSE SERPL-MCNC: 105 MG/DL (ref 70–99)
GLUCOSE SERPL-MCNC: 106 MG/DL (ref 70–99)
GLUCOSE SERPL-MCNC: 108 MG/DL (ref 70–99)
GLUCOSE SERPL-MCNC: 108 MG/DL (ref 70–99)
GLUCOSE SERPL-MCNC: 112 MG/DL (ref 70–99)
GLUCOSE SERPL-MCNC: 112 MG/DL (ref 70–99)
GLUCOSE SERPL-MCNC: 116 MG/DL (ref 70–99)
GLUCOSE SERPL-MCNC: 123 MG/DL (ref 70–99)
GLUCOSE SERPL-MCNC: 88 MG/DL (ref 70–99)
GLUCOSE SERPL-MCNC: 93 MG/DL (ref 70–99)
GLUCOSE SERPL-MCNC: 94 MG/DL (ref 70–99)
GLUCOSE SERPL-MCNC: 96 MG/DL (ref 70–99)
GLUCOSE SERPL-MCNC: 97 MG/DL (ref 70–99)
GLUCOSE SERPL-MCNC: 99 MG/DL (ref 70–99)
GLUCOSE SERPL-MCNC: 99 MG/DL (ref 70–99)
GLUCOSE UR-MCNC: NEGATIVE MG/DL
GLUCOSE UR-MCNC: NEGATIVE MG/DL
HCT VFR BLD AUTO: 20.9 % (ref 41–52)
HCT VFR BLD AUTO: 21.5 % (ref 41–52)
HCT VFR BLD AUTO: 21.6 % (ref 41–52)
HCT VFR BLD AUTO: 22.1 % (ref 41–52)
HCT VFR BLD AUTO: 22.1 % (ref 41–52)
HCT VFR BLD AUTO: 22.3 % (ref 41–52)
HCT VFR BLD AUTO: 22.5 % (ref 41–52)
HCT VFR BLD AUTO: 24.8 % (ref 41–52)
HCT VFR BLD AUTO: 26 % (ref 41–52)
HCT VFR BLD AUTO: 26 % (ref 41–52)
HCT VFR BLD AUTO: 26.8 % (ref 41–52)
HCT VFR BLD AUTO: 27.1 % (ref 41–52)
HCT VFR BLD AUTO: 27.5 % (ref 41–52)
HCT VFR BLD AUTO: 27.6 % (ref 41–52)
HCT VFR BLD AUTO: 28.2 % (ref 41–52)
HCT VFR BLD AUTO: 30.6 % (ref 41–52)
HCT VFR BLD AUTO: 31.4 % (ref 41–52)
HCT VFR BLD AUTO: 31.5 % (ref 41–52)
HGB BLD-MCNC: 10.6 G/DL (ref 13.5–17.5)
HGB BLD-MCNC: 10.9 G/DL (ref 13.5–17.5)
HGB BLD-MCNC: 10.9 G/DL (ref 13.5–17.5)
HGB BLD-MCNC: 7 G/DL (ref 13.5–17.5)
HGB BLD-MCNC: 7.3 G/DL (ref 13.5–17.5)
HGB BLD-MCNC: 7.4 G/DL (ref 13.5–17.5)
HGB BLD-MCNC: 7.5 G/DL (ref 13.5–17.5)
HGB BLD-MCNC: 7.6 G/DL (ref 13.5–17.5)
HGB BLD-MCNC: 8.7 G/DL (ref 13.5–17.5)
HGB BLD-MCNC: 8.8 G/DL (ref 13.5–17.5)
HGB BLD-MCNC: 8.9 G/DL (ref 13.5–17.5)
HGB BLD-MCNC: 9 G/DL (ref 13.5–17.5)
HGB BLD-MCNC: 9.1 G/DL (ref 13.5–17.5)
HGB BLD-MCNC: 9.3 G/DL (ref 13.5–17.5)
HGB BLD-MCNC: 9.7 G/DL (ref 13.5–17.5)
HGB BLD-MCNC: 9.8 G/DL (ref 13.5–17.5)
INR BLD: 1.6 (ref 0.9–1.2)
INR BLD: 1.8 (ref 0.9–1.2)
IRON SATN MFR SERPL: 16 % (ref 20–55)
IRON SERPL-MCNC: 23 MCG/DL (ref 45–182)
KETONES UR-MCNC: NEGATIVE MG/DL
KETONES UR-MCNC: NEGATIVE MG/DL
LACTATE SERPL-SCNC: 1.2 MMOL/L (ref 0.5–2.2)
LYMPHOCYTES # BLD: 0.4 K/UL (ref 1–4)
LYMPHOCYTES # BLD: 0.6 K/UL (ref 1–4)
LYMPHOCYTES # BLD: 0.7 K/UL (ref 1–4)
LYMPHOCYTES # BLD: 0.7 K/UL (ref 1–4)
LYMPHOCYTES # BLD: 0.8 K/UL (ref 1–4)
LYMPHOCYTES # BLD: 0.9 K/UL (ref 1–4)
LYMPHOCYTES # BLD: 0.9 K/UL (ref 1–4)
LYMPHOCYTES # BLD: 1.2 K/UL (ref 1–4)
LYMPHOCYTES # BLD: 1.3 K/UL (ref 1–4)
LYMPHOCYTES # BLD: 1.4 K/UL (ref 1–4)
LYMPHOCYTES # BLD: 1.5 K/UL (ref 1–4)
LYMPHOCYTES # BLD: 1.5 K/UL (ref 1–4)
LYMPHOCYTES # BLD: 1.6 K/UL (ref 1–4)
LYMPHOCYTES # BLD: 1.7 K/UL (ref 1–4)
LYMPHOCYTES # BLD: 1.9 K/UL (ref 1–4)
LYMPHOCYTES NFR BLD: 11 %
LYMPHOCYTES NFR BLD: 13 %
LYMPHOCYTES NFR BLD: 14 %
LYMPHOCYTES NFR BLD: 14 %
LYMPHOCYTES NFR BLD: 15 %
LYMPHOCYTES NFR BLD: 15 %
LYMPHOCYTES NFR BLD: 17 %
LYMPHOCYTES NFR BLD: 19 %
LYMPHOCYTES NFR BLD: 19 %
LYMPHOCYTES NFR BLD: 20 %
LYMPHOCYTES NFR BLD: 21 %
LYMPHOCYTES NFR BLD: 26 %
LYMPHOCYTES NFR BLD: 27 %
LYMPHOCYTES NFR BLD: 29 %
LYMPHOCYTES NFR BLD: 4 %
LYMPHOCYTES NFR BLD: 8 %
LYMPHOCYTES NFR BLD: 9 %
MAGNESIUM SERPL-MCNC: 1.6 MG/DL (ref 1.8–2.5)
MAGNESIUM SERPL-MCNC: 1.7 MG/DL (ref 1.8–2.5)
MAGNESIUM SERPL-MCNC: 1.7 MG/DL (ref 1.8–2.5)
MAGNESIUM SERPL-MCNC: 1.8 MG/DL (ref 1.8–2.5)
MAGNESIUM SERPL-MCNC: 1.9 MG/DL (ref 1.8–2.5)
MAGNESIUM SERPL-MCNC: 1.9 MG/DL (ref 1.8–2.5)
MAGNESIUM SERPL-MCNC: 2.1 MG/DL (ref 1.8–2.5)
MCH RBC QN AUTO: 28.6 PG (ref 27–32)
MCH RBC QN AUTO: 28.7 PG (ref 27–32)
MCH RBC QN AUTO: 28.7 PG (ref 27–32)
MCH RBC QN AUTO: 28.8 PG (ref 27–32)
MCH RBC QN AUTO: 28.9 PG (ref 27–32)
MCH RBC QN AUTO: 29.2 PG (ref 27–32)
MCH RBC QN AUTO: 29.3 PG (ref 27–32)
MCH RBC QN AUTO: 29.4 PG (ref 27–32)
MCH RBC QN AUTO: 29.5 PG (ref 27–32)
MCH RBC QN AUTO: 29.5 PG (ref 27–32)
MCH RBC QN AUTO: 29.6 PG (ref 27–32)
MCH RBC QN AUTO: 30 PG (ref 27–32)
MCH RBC QN AUTO: 30.4 PG (ref 27–32)
MCH RBC QN AUTO: 30.5 PG (ref 27–32)
MCH RBC QN AUTO: 31 PG (ref 27–32)
MCHC RBC AUTO-ENTMCNC: 32.9 G/DL (ref 32–37)
MCHC RBC AUTO-ENTMCNC: 33.4 G/DL (ref 32–37)
MCHC RBC AUTO-ENTMCNC: 33.4 G/DL (ref 32–37)
MCHC RBC AUTO-ENTMCNC: 33.5 G/DL (ref 32–37)
MCHC RBC AUTO-ENTMCNC: 33.5 G/DL (ref 32–37)
MCHC RBC AUTO-ENTMCNC: 33.9 G/DL (ref 32–37)
MCHC RBC AUTO-ENTMCNC: 33.9 G/DL (ref 32–37)
MCHC RBC AUTO-ENTMCNC: 34.1 G/DL (ref 32–37)
MCHC RBC AUTO-ENTMCNC: 34.2 G/DL (ref 32–37)
MCHC RBC AUTO-ENTMCNC: 34.4 G/DL (ref 32–37)
MCHC RBC AUTO-ENTMCNC: 34.6 G/DL (ref 32–37)
MCHC RBC AUTO-ENTMCNC: 34.7 G/DL (ref 32–37)
MCHC RBC AUTO-ENTMCNC: 35.3 G/DL (ref 32–37)
MCHC RBC AUTO-ENTMCNC: 35.6 G/DL (ref 32–37)
MCHC RBC AUTO-ENTMCNC: 35.6 G/DL (ref 32–37)
MCV RBC AUTO: 84.4 FL (ref 80–100)
MCV RBC AUTO: 84.6 FL (ref 80–100)
MCV RBC AUTO: 84.9 FL (ref 80–100)
MCV RBC AUTO: 85 FL (ref 80–100)
MCV RBC AUTO: 85.2 FL (ref 80–100)
MCV RBC AUTO: 85.3 FL (ref 80–100)
MCV RBC AUTO: 85.4 FL (ref 80–100)
MCV RBC AUTO: 85.5 FL (ref 80–100)
MCV RBC AUTO: 85.7 FL (ref 80–100)
MCV RBC AUTO: 85.7 FL (ref 80–100)
MCV RBC AUTO: 85.8 FL (ref 80–100)
MCV RBC AUTO: 86 FL (ref 80–100)
MCV RBC AUTO: 86 FL (ref 80–100)
MCV RBC AUTO: 86.3 FL (ref 80–100)
MCV RBC AUTO: 86.3 FL (ref 80–100)
MCV RBC AUTO: 87.6 FL (ref 80–100)
MCV RBC AUTO: 88.8 FL (ref 80–100)
METAMYELOCYTES # BLD MANUAL: 0.05 K/UL
METAMYELOCYTES # BLD MANUAL: 0.06 K/UL
METAMYELOCYTES # BLD MANUAL: 0.1 K/UL
METAMYELOCYTES # BLD MANUAL: 0.11 K/UL
METAMYELOCYTES # BLD MANUAL: 0.15 K/UL
METAMYELOCYTES # BLD MANUAL: 0.15 K/UL
METAMYELOCYTES # BLD MANUAL: 0.16 K/UL
METAMYELOCYTES # BLD MANUAL: 0.16 K/UL
METAMYELOCYTES NFR BLD: 1 %
METAMYELOCYTES NFR BLD: 1 %
METAMYELOCYTES NFR BLD: 2 %
MONOCYTES # BLD: 0.2 K/UL (ref 0–1)
MONOCYTES # BLD: 0.3 K/UL (ref 0–1)
MONOCYTES # BLD: 0.4 K/UL (ref 0–1)
MONOCYTES # BLD: 0.4 K/UL (ref 0–1)
MONOCYTES # BLD: 0.5 K/UL (ref 0–1)
MONOCYTES # BLD: 0.7 K/UL (ref 0–1)
MONOCYTES # BLD: 0.8 K/UL (ref 0–1)
MONOCYTES # BLD: 0.8 K/UL (ref 0–1)
MONOCYTES # BLD: 0.9 K/UL (ref 0–1)
MONOCYTES # BLD: 0.9 K/UL (ref 0–1)
MONOCYTES # BLD: 1.2 K/UL (ref 0–1)
MONOCYTES # BLD: 1.3 K/UL (ref 0–1)
MONOCYTES # BLD: 1.8 K/UL (ref 0–1)
MONOCYTES # BLD: 1.9 K/UL (ref 0–1)
MONOCYTES # BLD: 2 K/UL (ref 0–1)
MONOCYTES # BLD: 2 K/UL (ref 0–1)
MONOCYTES # BLD: 2.8 K/UL (ref 0–1)
MONOCYTES NFR BLD: 11 %
MONOCYTES NFR BLD: 11 %
MONOCYTES NFR BLD: 13 %
MONOCYTES NFR BLD: 14 %
MONOCYTES NFR BLD: 17 %
MONOCYTES NFR BLD: 17 %
MONOCYTES NFR BLD: 19 %
MONOCYTES NFR BLD: 2 %
MONOCYTES NFR BLD: 23 %
MONOCYTES NFR BLD: 26 %
MONOCYTES NFR BLD: 28 %
MONOCYTES NFR BLD: 29 %
MONOCYTES NFR BLD: 4 %
MONOCYTES NFR BLD: 7 %
MONOCYTES NFR BLD: 8 %
MONOCYTES NFR BLD: 8 %
MONOCYTES NFR BLD: 9 %
MRSA DNA SPEC QL NAA+PROBE: POSITIVE
MYELOCYTES NFR BLD: 1 %
MYELOCYTES NFR BLD: 2 %
MYELOCYTES NFR BLD: 3 %
NEUTROPHILS # BLD AUTO: 10.2 K/UL (ref 1.8–7.7)
NEUTROPHILS # BLD AUTO: 2.7 K/UL (ref 1.8–7.7)
NEUTROPHILS # BLD AUTO: 2.8 K/UL (ref 1.8–7.7)
NEUTROPHILS # BLD AUTO: 3.1 K/UL (ref 1.8–7.7)
NEUTROPHILS # BLD AUTO: 3.2 K/UL (ref 1.8–7.7)
NEUTROPHILS # BLD AUTO: 3.2 K/UL (ref 1.8–7.7)
NEUTROPHILS # BLD AUTO: 3.4 K/UL (ref 1.8–7.7)
NEUTROPHILS # BLD AUTO: 3.4 K/UL (ref 1.8–7.7)
NEUTROPHILS # BLD AUTO: 3.7 K/UL (ref 1.8–7.7)
NEUTROPHILS # BLD AUTO: 4.2 K/UL (ref 1.8–7.7)
NEUTROPHILS # BLD AUTO: 4.6 K/UL (ref 1.8–7.7)
NEUTROPHILS # BLD AUTO: 5.3 K/UL (ref 1.8–7.7)
NEUTROPHILS # BLD AUTO: 5.3 K/UL (ref 1.8–7.7)
NEUTROPHILS # BLD AUTO: 5.8 K/UL (ref 1.8–7.7)
NEUTROPHILS # BLD AUTO: 7.2 K/UL (ref 1.8–7.7)
NEUTROPHILS # BLD AUTO: 8.3 K/UL (ref 1.8–7.7)
NEUTROPHILS # BLD AUTO: 8.6 K/UL (ref 1.8–7.7)
NEUTROPHILS NFR BLD: 46 %
NEUTROPHILS NFR BLD: 48 %
NEUTROPHILS NFR BLD: 49 %
NEUTROPHILS NFR BLD: 49 %
NEUTROPHILS NFR BLD: 56 %
NEUTROPHILS NFR BLD: 60 %
NEUTROPHILS NFR BLD: 60 %
NEUTROPHILS NFR BLD: 61 %
NEUTROPHILS NFR BLD: 64 %
NEUTROPHILS NFR BLD: 65 %
NEUTROPHILS NFR BLD: 74 %
NEUTROPHILS NFR BLD: 75 %
NEUTROPHILS NFR BLD: 76 %
NEUTROPHILS NFR BLD: 80 %
NEUTROPHILS NFR BLD: 82 %
NEUTS BAND NFR BLD: 1 %
NEUTS BAND NFR BLD: 2 %
NEUTS BAND NFR BLD: 2 %
NEUTS BAND NFR BLD: 3 %
NEUTS BAND NFR BLD: 3 %
NEUTS BAND NFR BLD: 4 %
NEUTS BAND NFR BLD: 5 %
NEUTS BAND NFR BLD: 6 %
NEUTS BAND NFR BLD: 6 %
NITRITE UR QL STRIP.AUTO: NEGATIVE
NITRITE UR QL STRIP.AUTO: NEGATIVE
NRBC BLD-RTO: 1 % (ref ?–1)
OSMOLALITY UR CALC.SUM OF ELEC: 260 MOSM/KG (ref 275–295)
OSMOLALITY UR CALC.SUM OF ELEC: 261 MOSM/KG (ref 275–295)
OSMOLALITY UR CALC.SUM OF ELEC: 261 MOSM/KG (ref 275–295)
OSMOLALITY UR CALC.SUM OF ELEC: 262 MOSM/KG (ref 275–295)
OSMOLALITY UR CALC.SUM OF ELEC: 268 MOSM/KG (ref 275–295)
OSMOLALITY UR CALC.SUM OF ELEC: 269 MOSM/KG (ref 275–295)
OSMOLALITY UR CALC.SUM OF ELEC: 269 MOSM/KG (ref 275–295)
OSMOLALITY UR CALC.SUM OF ELEC: 270 MOSM/KG (ref 275–295)
OSMOLALITY UR CALC.SUM OF ELEC: 270 MOSM/KG (ref 275–295)
OSMOLALITY UR CALC.SUM OF ELEC: 272 MOSM/KG (ref 275–295)
OSMOLALITY UR CALC.SUM OF ELEC: 273 MOSM/KG (ref 275–295)
OSMOLALITY UR CALC.SUM OF ELEC: 275 MOSM/KG (ref 275–295)
OSMOLALITY UR CALC.SUM OF ELEC: 276 MOSM/KG (ref 275–295)
OSMOLALITY UR CALC.SUM OF ELEC: 278 MOSM/KG (ref 275–295)
OSMOLALITY UR CALC.SUM OF ELEC: 279 MOSM/KG (ref 275–295)
OSMOLALITY UR CALC.SUM OF ELEC: 279 MOSM/KG (ref 275–295)
OSMOLALITY UR CALC.SUM OF ELEC: 280 MOSM/KG (ref 275–295)
OSMOLALITY UR CALC.SUM OF ELEC: 280 MOSM/KG (ref 275–295)
OSMOLALITY UR: 511 MOSM/KG (ref 300–1100)
PH UR: 6 [PH] (ref 5–8)
PH UR: 8 [PH] (ref 5–8)
PHOSPHATE SERPL-MCNC: 3.1 MG/DL (ref 2.4–4.7)
PHOSPHATE SERPL-MCNC: 3.3 MG/DL (ref 2.4–4.7)
PLATELET # BLD AUTO: 142 K/UL (ref 140–400)
PLATELET # BLD AUTO: 142 K/UL (ref 140–400)
PLATELET # BLD AUTO: 145 K/UL (ref 140–400)
PLATELET # BLD AUTO: 146 K/UL (ref 140–400)
PLATELET # BLD AUTO: 155 K/UL (ref 140–400)
PLATELET # BLD AUTO: 157 K/UL (ref 140–400)
PLATELET # BLD AUTO: 170 K/UL (ref 140–400)
PLATELET # BLD AUTO: 173 K/UL (ref 140–400)
PLATELET # BLD AUTO: 182 K/UL (ref 140–400)
PLATELET # BLD AUTO: 183 K/UL (ref 140–400)
PLATELET # BLD AUTO: 184 K/UL (ref 140–400)
PLATELET # BLD AUTO: 187 K/UL (ref 140–400)
PLATELET # BLD AUTO: 194 K/UL (ref 140–400)
PLATELET # BLD AUTO: 200 K/UL (ref 140–400)
PLATELET # BLD AUTO: 201 K/UL (ref 140–400)
PLATELET # BLD AUTO: 202 K/UL (ref 140–400)
PLATELET # BLD AUTO: 216 K/UL (ref 140–400)
PMV BLD AUTO: 10 FL (ref 7.4–10.3)
PMV BLD AUTO: 10.1 FL (ref 7.4–10.3)
PMV BLD AUTO: 10.1 FL (ref 7.4–10.3)
PMV BLD AUTO: 10.2 FL (ref 7.4–10.3)
PMV BLD AUTO: 10.2 FL (ref 7.4–10.3)
PMV BLD AUTO: 10.3 FL (ref 7.4–10.3)
PMV BLD AUTO: 10.4 FL (ref 7.4–10.3)
PMV BLD AUTO: 10.5 FL (ref 7.4–10.3)
PMV BLD AUTO: 10.6 FL (ref 7.4–10.3)
PMV BLD AUTO: 10.7 FL (ref 7.4–10.3)
PMV BLD AUTO: 10.7 FL (ref 7.4–10.3)
PMV BLD AUTO: 10.8 FL (ref 7.4–10.3)
PMV BLD AUTO: 9.6 FL (ref 7.4–10.3)
PMV BLD AUTO: 9.7 FL (ref 7.4–10.3)
PMV BLD AUTO: 9.9 FL (ref 7.4–10.3)
POTASSIUM SERPL-SCNC: 3.7 MMOL/L (ref 3.3–5.1)
POTASSIUM SERPL-SCNC: 3.8 MMOL/L (ref 3.3–5.1)
POTASSIUM SERPL-SCNC: 3.9 MMOL/L (ref 3.3–5.1)
POTASSIUM SERPL-SCNC: 3.9 MMOL/L (ref 3.3–5.1)
POTASSIUM SERPL-SCNC: 4 MMOL/L (ref 3.3–5.1)
POTASSIUM SERPL-SCNC: 4 MMOL/L (ref 3.3–5.1)
POTASSIUM SERPL-SCNC: 4.1 MMOL/L (ref 3.3–5.1)
POTASSIUM SERPL-SCNC: 4.2 MMOL/L (ref 3.3–5.1)
POTASSIUM SERPL-SCNC: 4.3 MMOL/L (ref 3.3–5.1)
POTASSIUM SERPL-SCNC: 4.4 MMOL/L (ref 3.3–5.1)
POTASSIUM SERPL-SCNC: 4.5 MMOL/L (ref 3.3–5.1)
POTASSIUM SERPL-SCNC: 4.5 MMOL/L (ref 3.3–5.1)
POTASSIUM SERPL-SCNC: 4.6 MMOL/L (ref 3.3–5.1)
POTASSIUM SERPL-SCNC: 4.6 MMOL/L (ref 3.3–5.1)
POTASSIUM SERPL-SCNC: 4.7 MMOL/L (ref 3.3–5.1)
POTASSIUM SERPL-SCNC: 4.8 MMOL/L (ref 3.3–5.1)
POTASSIUM SERPL-SCNC: 5 MMOL/L (ref 3.3–5.1)
POTASSIUM SERPL-SCNC: 5.3 MMOL/L (ref 3.3–5.1)
PROCALCITONIN SERPL-MCNC: 0.07 NG/ML (ref ?–0.11)
PROT SERPL-MCNC: 5 G/DL (ref 5.9–8.4)
PROT UR-MCNC: 100 MG/DL
PROT UR-MCNC: 30 MG/DL
PROTHROMBIN TIME: 18.4 SECONDS (ref 11.8–14.5)
PROTHROMBIN TIME: 18.5 SECONDS (ref 11.8–14.5)
PROTHROMBIN TIME: 18.6 SECONDS (ref 11.8–14.5)
PROTHROMBIN TIME: 20.7 SECONDS (ref 11.8–14.5)
RBC # BLD AUTO: 2.45 M/UL (ref 4.5–5.9)
RBC # BLD AUTO: 2.45 M/UL (ref 4.5–5.9)
RBC # BLD AUTO: 2.56 M/UL (ref 4.5–5.9)
RBC # BLD AUTO: 2.58 M/UL (ref 4.5–5.9)
RBC # BLD AUTO: 2.62 M/UL (ref 4.5–5.9)
RBC # BLD AUTO: 2.62 M/UL (ref 4.5–5.9)
RBC # BLD AUTO: 2.63 M/UL (ref 4.5–5.9)
RBC # BLD AUTO: 2.9 M/UL (ref 4.5–5.9)
RBC # BLD AUTO: 3.05 M/UL (ref 4.5–5.9)
RBC # BLD AUTO: 3.1 M/UL (ref 4.5–5.9)
RBC # BLD AUTO: 3.1 M/UL (ref 4.5–5.9)
RBC # BLD AUTO: 3.15 M/UL (ref 4.5–5.9)
RBC # BLD AUTO: 3.22 M/UL (ref 4.5–5.9)
RBC # BLD AUTO: 3.29 M/UL (ref 4.5–5.9)
RBC # BLD AUTO: 3.54 M/UL (ref 4.5–5.9)
RBC # BLD AUTO: 3.67 M/UL (ref 4.5–5.9)
RBC # BLD AUTO: 3.71 M/UL (ref 4.5–5.9)
RBC #/AREA URNS AUTO: 6 /HPF
RBC #/AREA URNS AUTO: 6 /HPF
RH BLOOD TYPE: POSITIVE
SODIUM SERPL-SCNC: 125 MMOL/L (ref 136–144)
SODIUM SERPL-SCNC: 126 MMOL/L (ref 136–144)
SODIUM SERPL-SCNC: 128 MMOL/L (ref 136–144)
SODIUM SERPL-SCNC: 129 MMOL/L (ref 136–144)
SODIUM SERPL-SCNC: 130 MMOL/L (ref 136–144)
SODIUM SERPL-SCNC: 130 MMOL/L (ref 136–144)
SODIUM SERPL-SCNC: 131 MMOL/L (ref 136–144)
SODIUM SERPL-SCNC: 132 MMOL/L (ref 136–144)
SODIUM SERPL-SCNC: 133 MMOL/L (ref 136–144)
SODIUM UR-SCNC: 28 MMOL/L
SP GR UR STRIP: 1.01 (ref 1–1.03)
SP GR UR STRIP: 1.03 (ref 1–1.03)
TIBC SERPL-MCNC: 141 MCG/DL (ref 228–428)
TRANSFERRIN SERPL-MCNC: 107 MG/DL (ref 180–329)
TROPONIN I SERPL-MCNC: 0.01 NG/ML (ref ?–0.03)
TROPONIN I SERPL-MCNC: 0.01 NG/ML (ref ?–0.03)
TROPONIN I SERPL-MCNC: 0.03 NG/ML (ref ?–0.03)
TSH SERPL-ACNC: 2.26 UIU/ML (ref 0.45–5.33)
URATE SERPL-MCNC: 4.3 MG/DL (ref 3.3–8.7)
UROBILINOGEN UR STRIP-ACNC: <2
UROBILINOGEN UR STRIP-ACNC: <2
VARIANT LYMPHS NFR BLD MANUAL: 1 %
VARIANT LYMPHS NFR BLD MANUAL: 2 %
VIT B12 SERPL-MCNC: >1500 PG/ML (ref 181–914)
VIT C UR-MCNC: NEGATIVE MG/DL
VIT C UR-MCNC: NEGATIVE MG/DL
WBC # BLD AUTO: 11.3 K/UL (ref 4–11)
WBC # BLD AUTO: 11.6 K/UL (ref 4–11)
WBC # BLD AUTO: 12.1 K/UL (ref 4–11)
WBC # BLD AUTO: 4.7 K/UL (ref 4–11)
WBC # BLD AUTO: 5.2 K/UL (ref 4–11)
WBC # BLD AUTO: 5.5 K/UL (ref 4–11)
WBC # BLD AUTO: 5.5 K/UL (ref 4–11)
WBC # BLD AUTO: 5.6 K/UL (ref 4–11)
WBC # BLD AUTO: 5.7 K/UL (ref 4–11)
WBC # BLD AUTO: 6.2 K/UL (ref 4–11)
WBC # BLD AUTO: 6.5 K/UL (ref 4–11)
WBC # BLD AUTO: 6.9 K/UL (ref 4–11)
WBC # BLD AUTO: 7.4 K/UL (ref 4–11)
WBC # BLD AUTO: 7.5 K/UL (ref 4–11)
WBC # BLD AUTO: 7.8 K/UL (ref 4–11)
WBC # BLD AUTO: 9.6 K/UL (ref 4–11)
WBC # BLD AUTO: 9.9 K/UL (ref 4–11)
WBC #/AREA URNS AUTO: 169 /HPF
WBC #/AREA URNS AUTO: 17 /HPF

## 2018-01-01 PROCEDURE — 99233 SBSQ HOSP IP/OBS HIGH 50: CPT | Performed by: HOSPITALIST

## 2018-01-01 PROCEDURE — 99232 SBSQ HOSP IP/OBS MODERATE 35: CPT | Performed by: INTERNAL MEDICINE

## 2018-01-01 PROCEDURE — 99233 SBSQ HOSP IP/OBS HIGH 50: CPT | Performed by: INTERNAL MEDICINE

## 2018-01-01 PROCEDURE — 99239 HOSP IP/OBS DSCHRG MGMT >30: CPT | Performed by: HOSPITALIST

## 2018-01-01 PROCEDURE — 70450 CT HEAD/BRAIN W/O DYE: CPT | Performed by: EMERGENCY MEDICINE

## 2018-01-01 PROCEDURE — 71045 X-RAY EXAM CHEST 1 VIEW: CPT | Performed by: EMERGENCY MEDICINE

## 2018-01-01 PROCEDURE — 99222 1ST HOSP IP/OBS MODERATE 55: CPT | Performed by: INTERNAL MEDICINE

## 2018-01-01 PROCEDURE — 99305 1ST NF CARE MODERATE MDM 35: CPT | Performed by: INTERNAL MEDICINE

## 2018-01-01 PROCEDURE — 99308 SBSQ NF CARE LOW MDM 20: CPT | Performed by: NURSE PRACTITIONER

## 2018-01-01 PROCEDURE — 99308 SBSQ NF CARE LOW MDM 20: CPT | Performed by: INTERNAL MEDICINE

## 2018-01-01 PROCEDURE — 99309 SBSQ NF CARE MODERATE MDM 30: CPT | Performed by: NURSE PRACTITIONER

## 2018-01-01 PROCEDURE — 71260 CT THORAX DX C+: CPT | Performed by: HOSPITALIST

## 2018-01-01 PROCEDURE — 93970 EXTREMITY STUDY: CPT | Performed by: INTERNAL MEDICINE

## 2018-01-01 PROCEDURE — 99232 SBSQ HOSP IP/OBS MODERATE 35: CPT | Performed by: HOSPITALIST

## 2018-01-01 PROCEDURE — 71250 CT THORAX DX C-: CPT | Performed by: INTERNAL MEDICINE

## 2018-01-01 PROCEDURE — 30233N1 TRANSFUSION OF NONAUTOLOGOUS RED BLOOD CELLS INTO PERIPHERAL VEIN, PERCUTANEOUS APPROACH: ICD-10-PCS | Performed by: HOSPITALIST

## 2018-01-01 PROCEDURE — 99223 1ST HOSP IP/OBS HIGH 75: CPT | Performed by: HOSPITALIST

## 2018-01-01 PROCEDURE — 73030 X-RAY EXAM OF SHOULDER: CPT | Performed by: SPECIALIST

## 2018-01-01 PROCEDURE — 93306 TTE W/DOPPLER COMPLETE: CPT | Performed by: HOSPITALIST

## 2018-01-01 PROCEDURE — 99223 1ST HOSP IP/OBS HIGH 75: CPT | Performed by: INTERNAL MEDICINE

## 2018-01-01 PROCEDURE — 70486 CT MAXILLOFACIAL W/O DYE: CPT | Performed by: EMERGENCY MEDICINE

## 2018-01-01 PROCEDURE — 71045 X-RAY EXAM CHEST 1 VIEW: CPT | Performed by: INTERNAL MEDICINE

## 2018-01-01 PROCEDURE — 99310 SBSQ NF CARE HIGH MDM 45: CPT | Performed by: NURSE PRACTITIONER

## 2018-01-01 PROCEDURE — 71046 X-RAY EXAM CHEST 2 VIEWS: CPT | Performed by: HOSPITALIST

## 2018-01-01 PROCEDURE — 99222 1ST HOSP IP/OBS MODERATE 55: CPT | Performed by: HOSPITALIST

## 2018-01-01 PROCEDURE — 73560 X-RAY EXAM OF KNEE 1 OR 2: CPT | Performed by: SPECIALIST

## 2018-01-01 PROCEDURE — 74230 X-RAY XM SWLNG FUNCJ C+: CPT | Performed by: HOSPITALIST

## 2018-01-01 PROCEDURE — 73030 X-RAY EXAM OF SHOULDER: CPT | Performed by: EMERGENCY MEDICINE

## 2018-01-01 RX ORDER — SODIUM CHLORIDE 1000 MG
1 TABLET, SOLUBLE MISCELLANEOUS 2 TIMES DAILY WITH MEALS
Status: DISCONTINUED | OUTPATIENT
Start: 2018-01-01 | End: 2018-01-01

## 2018-01-01 RX ORDER — MAGNESIUM OXIDE 400 MG (241.3 MG MAGNESIUM) TABLET
400 TABLET ONCE
Status: COMPLETED | OUTPATIENT
Start: 2018-01-01 | End: 2018-01-01

## 2018-01-01 RX ORDER — GUAIFENESIN 100 MG/5ML
200 SOLUTION ORAL EVERY 4 HOURS PRN
Status: DISCONTINUED | OUTPATIENT
Start: 2018-01-01 | End: 2018-01-01

## 2018-01-01 RX ORDER — AMOXICILLIN AND CLAVULANATE POTASSIUM 875; 125 MG/1; MG/1
1 TABLET, FILM COATED ORAL 2 TIMES DAILY
Qty: 10 TABLET | Refills: 0 | Status: SHIPPED | OUTPATIENT
Start: 2018-01-01 | End: 2018-01-01

## 2018-01-01 RX ORDER — ACETAMINOPHEN 325 MG/1
650 TABLET ORAL ONCE
Status: COMPLETED | OUTPATIENT
Start: 2018-01-01 | End: 2018-01-01

## 2018-01-01 RX ORDER — NUT.TX.GLUCOSE INTOLERANCE,SOY
30 BAR ORAL 3 TIMES DAILY
COMMUNITY

## 2018-01-01 RX ORDER — HYDROCODONE BITARTRATE AND ACETAMINOPHEN 5; 325 MG/1; MG/1
2 TABLET ORAL EVERY 4 HOURS PRN
Status: DISCONTINUED | OUTPATIENT
Start: 2018-01-01 | End: 2018-01-01

## 2018-01-01 RX ORDER — HYDROCODONE BITARTRATE AND ACETAMINOPHEN 5; 325 MG/1; MG/1
1 TABLET ORAL EVERY 4 HOURS PRN
Status: DISCONTINUED | OUTPATIENT
Start: 2018-01-01 | End: 2018-01-01

## 2018-01-01 RX ORDER — IPRATROPIUM BROMIDE AND ALBUTEROL SULFATE 2.5; .5 MG/3ML; MG/3ML
3 SOLUTION RESPIRATORY (INHALATION) 3 TIMES DAILY
Status: DISCONTINUED | OUTPATIENT
Start: 2018-01-01 | End: 2018-01-01

## 2018-01-01 RX ORDER — SODIUM CHLORIDE 9 MG/ML
INJECTION, SOLUTION INTRAVENOUS
Status: COMPLETED
Start: 2018-01-01 | End: 2018-01-01

## 2018-01-01 RX ORDER — SODIUM PHOSPHATE, DIBASIC AND SODIUM PHOSPHATE, MONOBASIC 7; 19 G/133ML; G/133ML
1 ENEMA RECTAL ONCE AS NEEDED
Status: DISCONTINUED | OUTPATIENT
Start: 2018-01-01 | End: 2018-01-01

## 2018-01-01 RX ORDER — LORAZEPAM 2 MG/ML
1 INJECTION INTRAMUSCULAR
Status: DISCONTINUED | OUTPATIENT
Start: 2018-01-01 | End: 2018-01-01

## 2018-01-01 RX ORDER — FUROSEMIDE 10 MG/ML
20 INJECTION INTRAMUSCULAR; INTRAVENOUS ONCE
Status: COMPLETED | OUTPATIENT
Start: 2018-01-01 | End: 2018-01-01

## 2018-01-01 RX ORDER — SERTRALINE HYDROCHLORIDE 25 MG/1
25 TABLET, FILM COATED ORAL NIGHTLY
COMMUNITY

## 2018-01-01 RX ORDER — TRAMADOL HYDROCHLORIDE 50 MG/1
50 TABLET ORAL EVERY 4 HOURS PRN
Status: DISCONTINUED | OUTPATIENT
Start: 2018-01-01 | End: 2018-01-01

## 2018-01-01 RX ORDER — DOCUSATE SODIUM 100 MG/1
100 CAPSULE, LIQUID FILLED ORAL 2 TIMES DAILY
Status: DISCONTINUED | OUTPATIENT
Start: 2018-01-01 | End: 2018-01-01

## 2018-01-01 RX ORDER — ACETAMINOPHEN 325 MG/1
650 TABLET ORAL EVERY 6 HOURS PRN
Qty: 30 TABLET | Refills: 0 | Status: SHIPPED | OUTPATIENT
Start: 2018-01-01

## 2018-01-01 RX ORDER — 0.9 % SODIUM CHLORIDE 0.9 %
VIAL (ML) INJECTION
Status: DISPENSED
Start: 2018-01-01 | End: 2018-01-01

## 2018-01-01 RX ORDER — NYSTATIN 10B UNIT
100000 POWDER (EA) MISCELLANEOUS 2 TIMES DAILY
COMMUNITY

## 2018-01-01 RX ORDER — HEPARIN SODIUM 5000 [USP'U]/ML
5000 INJECTION, SOLUTION INTRAVENOUS; SUBCUTANEOUS EVERY 12 HOURS SCHEDULED
Qty: 60 ML | Refills: 0 | Status: SHIPPED | OUTPATIENT
Start: 2018-01-01

## 2018-01-01 RX ORDER — SODIUM CHLORIDE 9 MG/ML
INJECTION, SOLUTION INTRAVENOUS CONTINUOUS
Status: DISCONTINUED | OUTPATIENT
Start: 2018-01-01 | End: 2018-01-01

## 2018-01-01 RX ORDER — FUROSEMIDE 20 MG/1
20 TABLET ORAL EVERY OTHER DAY
Qty: 30 TABLET | Refills: 0 | Status: SHIPPED | OUTPATIENT
Start: 2018-01-01

## 2018-01-01 RX ORDER — VITAMIN C
1 TAB ORAL 2 TIMES DAILY
Status: DISCONTINUED | OUTPATIENT
Start: 2018-01-01 | End: 2018-01-01

## 2018-01-01 RX ORDER — TRAMADOL HYDROCHLORIDE 50 MG/1
50 TABLET ORAL EVERY 4 HOURS PRN
Qty: 10 TABLET | Refills: 0 | Status: SHIPPED | OUTPATIENT
Start: 2018-01-01

## 2018-01-01 RX ORDER — ACETAMINOPHEN 325 MG/1
325 TABLET ORAL EVERY 4 HOURS PRN
Status: DISPENSED | OUTPATIENT
Start: 2018-01-01 | End: 2018-01-01

## 2018-01-01 RX ORDER — TRAMADOL HYDROCHLORIDE 50 MG/1
50 TABLET ORAL ONCE
Status: COMPLETED | OUTPATIENT
Start: 2018-01-01 | End: 2018-01-01

## 2018-01-01 RX ORDER — NUT.TX.GLUCOSE INTOLERANCE,SOY
30 BAR ORAL 3 TIMES DAILY
Status: DISCONTINUED | OUTPATIENT
Start: 2018-01-01 | End: 2018-01-01 | Stop reason: RX

## 2018-01-01 RX ORDER — POTASSIUM CHLORIDE 20 MEQ/1
40 TABLET, EXTENDED RELEASE ORAL ONCE
Status: COMPLETED | OUTPATIENT
Start: 2018-01-01 | End: 2018-01-01

## 2018-01-01 RX ORDER — DOCUSATE SODIUM 100 MG/1
100 CAPSULE, LIQUID FILLED ORAL 2 TIMES DAILY
COMMUNITY

## 2018-01-01 RX ORDER — ALBUTEROL SULFATE 2.5 MG/3ML
2.5 SOLUTION RESPIRATORY (INHALATION)
Status: DISCONTINUED | OUTPATIENT
Start: 2018-01-01 | End: 2018-01-01

## 2018-01-01 RX ORDER — SERTRALINE HYDROCHLORIDE 25 MG/1
25 TABLET, FILM COATED ORAL NIGHTLY
Status: DISCONTINUED | OUTPATIENT
Start: 2018-01-01 | End: 2018-01-01

## 2018-01-01 RX ORDER — ACETAMINOPHEN 325 MG/1
650 TABLET ORAL EVERY 4 HOURS PRN
Status: DISCONTINUED | OUTPATIENT
Start: 2018-01-01 | End: 2018-01-01

## 2018-01-01 RX ORDER — POLYETHYLENE GLYCOL 3350 17 G/17G
17 POWDER, FOR SOLUTION ORAL DAILY PRN
COMMUNITY

## 2018-01-01 RX ORDER — TRAMADOL HYDROCHLORIDE 50 MG/1
50 TABLET ORAL EVERY 4 HOURS PRN
Qty: 20 TABLET | Refills: 0 | Status: ON HOLD | OUTPATIENT
Start: 2018-01-01 | End: 2018-01-01

## 2018-01-01 RX ORDER — 0.9 % SODIUM CHLORIDE 0.9 %
VIAL (ML) INJECTION
Status: COMPLETED
Start: 2018-01-01 | End: 2018-01-01

## 2018-01-01 RX ORDER — LORAZEPAM 2 MG/ML
0.5 INJECTION INTRAMUSCULAR ONCE
Status: COMPLETED | OUTPATIENT
Start: 2018-01-01 | End: 2018-01-01

## 2018-01-01 RX ORDER — BISACODYL 10 MG
10 SUPPOSITORY, RECTAL RECTAL
Status: DISCONTINUED | OUTPATIENT
Start: 2018-01-01 | End: 2018-01-01

## 2018-01-01 RX ORDER — ALBUTEROL SULFATE 2.5 MG/3ML
2.5 SOLUTION RESPIRATORY (INHALATION) EVERY 6 HOURS PRN
Status: DISCONTINUED | OUTPATIENT
Start: 2018-01-01 | End: 2018-01-01

## 2018-01-01 RX ORDER — MAGNESIUM OXIDE 400 MG (241.3 MG MAGNESIUM) TABLET
500 TABLET DAILY
Status: DISCONTINUED | OUTPATIENT
Start: 2018-01-01 | End: 2018-01-01

## 2018-01-01 RX ORDER — OYSTER SHELL CALCIUM WITH VITAMIN D 500; 200 MG/1; [IU]/1
1 TABLET, FILM COATED ORAL DAILY
Status: DISCONTINUED | OUTPATIENT
Start: 2018-01-01 | End: 2018-01-01

## 2018-01-01 RX ORDER — TRAMADOL HYDROCHLORIDE 50 MG/1
50 TABLET ORAL EVERY 6 HOURS PRN
Status: DISCONTINUED | OUTPATIENT
Start: 2018-01-01 | End: 2018-01-01

## 2018-01-01 RX ORDER — FOLIC ACID 1 MG/1
1 TABLET ORAL DAILY
Status: DISCONTINUED | OUTPATIENT
Start: 2018-01-01 | End: 2018-01-01

## 2018-01-01 RX ORDER — LORAZEPAM 1 MG/1
2 TABLET ORAL
Status: DISCONTINUED | OUTPATIENT
Start: 2018-01-01 | End: 2018-01-01

## 2018-01-01 RX ORDER — THIAMINE HYDROCHLORIDE 100 MG/ML
100 INJECTION, SOLUTION INTRAMUSCULAR; INTRAVENOUS DAILY
Status: COMPLETED | OUTPATIENT
Start: 2018-01-01 | End: 2018-01-01

## 2018-01-01 RX ORDER — MULTIPLE VITAMINS W/ MINERALS TAB 9MG-400MCG
1 TAB ORAL DAILY
Status: DISCONTINUED | OUTPATIENT
Start: 2018-01-01 | End: 2018-01-01

## 2018-01-01 RX ORDER — VITAMIN C
1 TAB ORAL 2 TIMES DAILY
Qty: 30 TABLET | Refills: 0 | Status: SHIPPED | OUTPATIENT
Start: 2018-01-01

## 2018-01-01 RX ORDER — MAGNESIUM SULFATE HEPTAHYDRATE 40 MG/ML
2 INJECTION, SOLUTION INTRAVENOUS ONCE
Status: COMPLETED | OUTPATIENT
Start: 2018-01-01 | End: 2018-01-01

## 2018-01-01 RX ORDER — GUAIFENESIN 100 MG/5ML
200 SOLUTION ORAL EVERY 4 HOURS PRN
Qty: 236 ML | Refills: 0 | Status: SHIPPED | OUTPATIENT
Start: 2018-01-01

## 2018-01-01 RX ORDER — ASPIRIN 81 MG/1
81 TABLET, CHEWABLE ORAL DAILY
Status: DISCONTINUED | OUTPATIENT
Start: 2018-01-01 | End: 2018-01-01

## 2018-01-01 RX ORDER — ACETAMINOPHEN 325 MG/1
650 TABLET ORAL EVERY 6 HOURS PRN
Status: DISCONTINUED | OUTPATIENT
Start: 2018-01-01 | End: 2018-01-01

## 2018-01-01 RX ORDER — HEPARIN SODIUM 5000 [USP'U]/ML
5000 INJECTION, SOLUTION INTRAVENOUS; SUBCUTANEOUS EVERY 8 HOURS SCHEDULED
Status: DISCONTINUED | OUTPATIENT
Start: 2018-01-01 | End: 2018-01-01

## 2018-01-01 RX ORDER — TOLVAPTAN 15 MG/1
15 TABLET ORAL ONCE
Status: COMPLETED | OUTPATIENT
Start: 2018-01-01 | End: 2018-01-01

## 2018-01-01 RX ORDER — IPRATROPIUM BROMIDE AND ALBUTEROL SULFATE 2.5; .5 MG/3ML; MG/3ML
3 SOLUTION RESPIRATORY (INHALATION) ONCE
Status: COMPLETED | OUTPATIENT
Start: 2018-01-01 | End: 2018-01-01

## 2018-01-01 RX ORDER — HEPARIN SODIUM 5000 [USP'U]/ML
5000 INJECTION, SOLUTION INTRAVENOUS; SUBCUTANEOUS EVERY 8 HOURS SCHEDULED
Status: ON HOLD | COMMUNITY
Start: 2018-01-01 | End: 2018-01-01

## 2018-01-01 RX ORDER — PANTOPRAZOLE SODIUM 40 MG/1
40 TABLET, DELAYED RELEASE ORAL
Status: DISCONTINUED | OUTPATIENT
Start: 2018-01-01 | End: 2018-01-01

## 2018-01-01 RX ORDER — POLYETHYLENE GLYCOL 3350 17 G/17G
17 POWDER, FOR SOLUTION ORAL DAILY PRN
Status: DISCONTINUED | OUTPATIENT
Start: 2018-01-01 | End: 2018-01-01

## 2018-01-01 RX ORDER — SODIUM CHLORIDE 0.9 % (FLUSH) 0.9 %
3 SYRINGE (ML) INJECTION AS NEEDED
Status: DISCONTINUED | OUTPATIENT
Start: 2018-01-01 | End: 2018-01-01

## 2018-01-01 RX ORDER — LORAZEPAM 2 MG/ML
2 INJECTION INTRAMUSCULAR
Status: DISCONTINUED | OUTPATIENT
Start: 2018-01-01 | End: 2018-01-01

## 2018-01-01 RX ORDER — MULTIPLE VITAMINS W/ MINERALS TAB 9MG-400MCG
2 TAB ORAL DAILY
Status: DISCONTINUED | OUTPATIENT
Start: 2018-01-01 | End: 2018-01-01

## 2018-01-01 RX ORDER — IPRATROPIUM BROMIDE AND ALBUTEROL SULFATE 2.5; .5 MG/3ML; MG/3ML
SOLUTION RESPIRATORY (INHALATION)
Qty: 30 VIAL | Refills: 0 | Status: SHIPPED | OUTPATIENT
Start: 2018-01-01

## 2018-01-01 RX ORDER — FUROSEMIDE 40 MG/1
40 TABLET ORAL DAILY
Status: ON HOLD | COMMUNITY
End: 2018-01-01

## 2018-01-01 RX ORDER — FUROSEMIDE 20 MG/1
20 TABLET ORAL ONCE
Status: COMPLETED | OUTPATIENT
Start: 2018-01-01 | End: 2018-01-01

## 2018-01-01 RX ORDER — ASPIRIN 81 MG/1
81 TABLET ORAL DAILY
Status: DISCONTINUED | OUTPATIENT
Start: 2018-01-01 | End: 2018-01-01

## 2018-01-01 RX ORDER — MELATONIN
100 DAILY
Status: DISCONTINUED | OUTPATIENT
Start: 2018-01-01 | End: 2018-01-01

## 2018-01-01 RX ORDER — PANTOPRAZOLE SODIUM 40 MG/1
40 TABLET, DELAYED RELEASE ORAL
Qty: 30 TABLET | Refills: 0 | Status: SHIPPED | OUTPATIENT
Start: 2018-01-01

## 2018-01-01 RX ORDER — LORAZEPAM 1 MG/1
1 TABLET ORAL
Status: DISCONTINUED | OUTPATIENT
Start: 2018-01-01 | End: 2018-01-01

## 2018-01-01 RX ORDER — SODIUM CHLORIDE 0.9 % (FLUSH) 0.9 %
10 SYRINGE (ML) INJECTION AS NEEDED
Status: DISCONTINUED | OUTPATIENT
Start: 2018-01-01 | End: 2018-01-01

## 2018-01-01 RX ORDER — FOLIC ACID 1 MG/1
1 TABLET ORAL DAILY
Qty: 30 TABLET | Refills: 0 | Status: SHIPPED | OUTPATIENT
Start: 2018-01-01

## 2018-01-01 RX ORDER — CALCIUM CARBONATE 500(1250)
500 TABLET ORAL 2 TIMES DAILY WITH MEALS
Status: DISCONTINUED | OUTPATIENT
Start: 2018-01-01 | End: 2018-01-01

## 2018-01-01 RX ORDER — HEPARIN SODIUM 5000 [USP'U]/ML
5000 INJECTION, SOLUTION INTRAVENOUS; SUBCUTANEOUS EVERY 12 HOURS SCHEDULED
Status: DISCONTINUED | OUTPATIENT
Start: 2018-01-01 | End: 2018-01-01

## 2018-01-01 RX ORDER — SODIUM CHLORIDE 9 MG/ML
INJECTION, SOLUTION INTRAVENOUS ONCE
Status: COMPLETED | OUTPATIENT
Start: 2018-01-01 | End: 2018-01-01

## 2018-01-01 RX ORDER — ONDANSETRON 2 MG/ML
4 INJECTION INTRAMUSCULAR; INTRAVENOUS EVERY 6 HOURS PRN
Status: DISCONTINUED | OUTPATIENT
Start: 2018-01-01 | End: 2018-01-01

## 2018-02-13 ENCOUNTER — PRIOR ORIGINAL RECORDS (OUTPATIENT)
Dept: OTHER | Age: 78
End: 2018-02-13

## 2018-02-27 ENCOUNTER — PRIOR ORIGINAL RECORDS (OUTPATIENT)
Dept: OTHER | Age: 78
End: 2018-02-27

## 2018-03-12 ENCOUNTER — PRIOR ORIGINAL RECORDS (OUTPATIENT)
Dept: OTHER | Age: 78
End: 2018-03-12

## 2018-06-17 PROBLEM — S00.83XA CONTUSION OF FACE, INITIAL ENCOUNTER: Status: ACTIVE | Noted: 2018-01-01

## 2018-06-17 PROBLEM — S09.8XXA BLUNT HEAD INJURY, INITIAL ENCOUNTER: Status: ACTIVE | Noted: 2018-01-01

## 2018-06-17 PROBLEM — R55 SYNCOPE: Status: ACTIVE | Noted: 2018-01-01

## 2018-06-17 PROBLEM — E87.1 HYPONATREMIA: Status: ACTIVE | Noted: 2018-01-01

## 2018-06-17 PROBLEM — S42.211A CLOSED DISPLACED FRACTURE OF SURGICAL NECK OF RIGHT HUMERUS, UNSPECIFIED FRACTURE MORPHOLOGY, INITIAL ENCOUNTER: Status: ACTIVE | Noted: 2018-01-01

## 2018-06-17 PROBLEM — R55 SYNCOPE AND COLLAPSE: Status: ACTIVE | Noted: 2018-01-01

## 2018-06-17 PROBLEM — S00.81XA ABRASION OF FACE, INITIAL ENCOUNTER: Status: ACTIVE | Noted: 2018-01-01

## 2018-06-17 NOTE — ED INITIAL ASSESSMENT (HPI)
Pt presents +ETOH, was walking out to car and fell face down, bleeding to nose from glasses. Was laying outside with jacket on for approx 1 hr.

## 2018-06-18 NOTE — OCCUPATIONAL THERAPY NOTE
OCCUPATIONAL THERAPY EVALUATION - INPATIENT      Room Number: 403/403-A  Evaluation Date: 6/18/2018  Type of Evaluation: Initial  Presenting Problem: s/p fall with R proximal humeral fracture    Physician Order: IP Consult to Occupational Therapy  Reason f caregiver services as social support is limited; continue to assess to make final d/c recommendations.        DISCHARGE RECOMMENDATIONS  OT Discharge Recommendations: Undetermined  OT Device Recommendations: None    PLAN  OT Treatment Plan: Balance activiti intact  Orientation Level:  oriented x4  Memory:  continue to asses; may benefit from cog assessment  Following Commands:  follows multistep commands with repetition  Safety Judgement:  decreased awareness of need for assistance and decreased awareness of Comment:     Patient will complete LE dressing Mod I   Comment:    Patient will complete toileting Mod I   Comment:         Goals  on: 18  Frequency: 3-5x week (OBS)    Kelly Burrell OTR/L   5 [facility]

## 2018-06-18 NOTE — OCCUPATIONAL THERAPY NOTE
OCCUPATIONAL THERAPY TREATMENT NOTE - INPATIENT    Room Number: 403/403-A         Presenting Problem: s/p fall with R proximal humeral fracture     Problem List  Principal Problem:    Closed displaced fracture of surgical neck of right humerus, unspecified Putting on and taking off regular upper body clothing?: Total  -   Taking care of personal grooming such as brushing teeth?: A Little  -   Eating meals?: A Little    AM-PAC Score:  Score: 13  Approx Degree of Impairment: 63.03%  Standardized Score (AM-PAC

## 2018-06-18 NOTE — CONSULTS
Ortho  70 y/o R handed male who sustained a syncopal? Fall today after imbibing 2 beers and 2 shots with a blood ETOH of 176 while going outside to lock the door of his new [address] [place] [address] Road.   He laid there for about an hour unable to arise and used the car

## 2018-06-18 NOTE — PROGRESS NOTES
[facility]    Progress Note    Todd Vergara Patient Status:  Observation    3/29/1940 MRN [id number]   Location [facility] 4W/SW/SE Attending Kike Morales MD   Hosp Day # 0 PCP Johanne Loco MD       Subjective:   Slim Phan 10 mg, Rectal, Daily PRN  •  FLEET ENEMA (FLEET) 7-19 GM/118ML enema 133 mL, 1 enema, Rectal, Once PRN  •  TraMADol HCl (ULTRAM) tab 50 mg, 50 mg, Oral, Q6H PRN  •  mupirocin (BACTROBAN) 2% nasal ointment OINT 1 Application, 1 Application, Each Nare, BID right lateral sixth rib fracture. Dictated by (CST): Efren Santos MD on 6/17/2018 at 18:06     Approved by (CST): Efren Santos MD on 6/17/2018 at 18:09          Ct Brain Or Head ([id number])    Result Date: 6/17/2018  CONCLUSION:  1.  No acute intracra

## 2018-06-18 NOTE — H&P
[facility] Patient Status:  Emergency    3/29/1940 MRN [id number]   Location [facility] Attending Micheal Negro MD   Hosp Day # 0 PCP Olivia Francisco MD     Date: week     Comment: daily    Allergies/Medications: Allergies: No Known Allergies    (Not in a hospital admission)    Review of Systems:     A comprehensive 12 point review of systems was completed. Pertinent positives and negatives noted in the the HPI. suggest associated low-grade acromioclavicular joint injury. 3. Calcific tendinitis of the right rotator cuff. 4. Demineralization. 5. Chronic/healed right lateral sixth rib fracture.      Dictated by (CST): Ramón Rodriguez MD on 6/17/2018 at 18:06     Appr patient in well over half time in face-to-face discussion of further evaluation and therapy. Keshawn Espinal.  Radha Arellano MD  6/17/2018

## 2018-06-18 NOTE — PHYSICAL THERAPY NOTE
PHYSICAL THERAPY EVALUATION - INPATIENT     Room Number: 403/403-A  Evaluation Date: 6/18/2018  Type of Evaluation: Initial   Physician Order: PT Eval and Treat    Presenting Problem: closed displaced fx of surgical neck of R humerus  Reason for Therapy: RECOMMENDATIONS  PT Discharge Recommendations: Sub-acute rehabilitation;24 hour care/supervision    PLAN  PT Treatment Plan: Bed mobility;Transfer training;Gait training;Strengthening;Patient education; Body mechanics  Rehab Potential : Fair  Frequency (Obs (ramp)     Stairs to Bedroom: 0 (stairs to basement for laundry)       Lives With: Alone  Drives: Yes  Patient Owned Equipment: Rolling walker (quad cane, SPC, raised toilet seat)       Prior Level of [place]: Pt was independent w/ ADLs (cooking, geovanna from a bed to a chair (including a wheelchair)?: A Little   -   Need to walk in hospital room?: A Little   -   Climbing 3-5 steps with a railing?: A Lot     AM-PAC Score:  Raw Score: 14   PT Approx Degree of Impairment Score: 61.29%   Standardized Score (A

## 2018-06-18 NOTE — ED PROVIDER NOTES
Patient Seen in: [facility] AND [facility] Emergency Department    History   Patient presents with:  Fall (musculoskeletal, neurologic)    Stated Complaint: ETOH, fall, heat exposure    HPI    69year old male with a past medical history of hypertension, regular Temp: 99 °F (37.2 °C)  Temp src: Oral  SpO2: 93 %  O2 Device: None (Room air)    Current:/67   Pulse 80   Temp 99 °F (37.2 °C) (Oral)   Resp 17   Ht 170.2 cm (5' 7\")   Wt 72.6 kg   SpO2 96%   BMI 25.06 kg/m²         Physical Exam   Constitutional: H Psychiatric: He has a normal mood and affect. His behavior is normal.   Nursing note and vitals reviewed.           ED Course     Labs Reviewed   BASIC METABOLIC PANEL (8) - Abnormal; Notable for the following:        Result Value    Glucose 106 (*)     Sod Radiology findings: Xr Shoulder, Complete (min 2 Views), Right (cpt=73030)    Result Date: 6/17/2018  CONCLUSION:  1. Acute mildly displaced and comminuted fracture extending through the surgical neck of the right humerus.  The glenohumeral articulation is Critical Care:  I spent a total of 30 minutes of critical care time in obtaining history, performing a physical exam, bedside monitoring of interventions, collecting and interpreting tests and discussion with consultants but not including time spent perform

## 2018-06-18 NOTE — PHYSICAL THERAPY NOTE
PHYSICAL THERAPY TREATMENT NOTE - INPATIENT     Room Number: 403/403-A       Presenting Problem: closed displaced fx of surgical neck of R humerus    Problem List  Principal Problem:    Closed displaced fracture of surgical neck of right humerus, unspecifi Static Sitting: Fair +  Dynamic Sitting: Fair +           Static Standing: Fair -  Dynamic Standing: Fair -    ACTIVITY TOLERANCE  O2 Saturation with activity 96%  Room air  Heart Rate: with activ Current Status Amb 200 ft / [facility]   Goal #4 Patient to demonstrate independence with home activity/exercise instructions provided to patient in preparation for discharge.    Goal #4   Current Status IN PROGRESS   Goal #5     Goal #5   Current Status

## 2018-06-19 NOTE — CM/SW NOTE
JAVIER met with the pt. At bedside. The pt. Lives alone in a 1 level home with a basement. There is a ramp to enter the house. The pt. Reports being independent prior to admission with adls, ambulation and driving. The pt denies any DME. The pt.  Has a cou

## 2018-06-19 NOTE — OCCUPATIONAL THERAPY NOTE
Attempted to work with patient; RN provided consent to proceed; pt was with transport being taken to CT for test; rule out PE per RN. Will follow up if schedule permits.     Zara Burrell, OTR/L   5 [facility]

## 2018-06-19 NOTE — PHYSICAL THERAPY NOTE
PHYSICAL THERAPY TREATMENT NOTE - INPATIENT     Room Number: 403/403-A       Presenting Problem: closed displaced fx of surgical neck of R humerus    Problem List  Principal Problem:    Closed displaced fracture of surgical neck of right humerus, unspecifi Static Sitting: Good  Dynamic Sitting: Fair +           Static Standing: Fair  Dynamic Standing: Fair -    ACTIVITY TOLERANCE  O2 Saturation with activity 96%  Room air  Heart Rate: with activity 1 independence with home activity/exercise instructions provided to patient in preparation for discharge.    Goal #4   Current Status IN PROGRESS   Goal #5     Goal #5   Current Status     Goal #6     Goal #6  Current Status

## 2018-06-19 NOTE — PROGRESS NOTES
[facility] HOSP - [facility]    Progress Note    Howie Rodriguez Patient Status:  Observation    3/29/1940 MRN [id number]   Location [facility] 4W/SW/SE Attending Chidi Carranza MD   Hosp Day # 0 PCP Leanna Merchant MD       Subjective:   Percy Breed rectal suppository 10 mg, 10 mg, Rectal, Daily PRN  •  FLEET ENEMA (FLEET) 7-19 GM/118ML enema 133 mL, 1 enema, Rectal, Once PRN  •  TraMADol HCl (ULTRAM) tab 50 mg, 50 mg, Oral, Q6H PRN  •  mupirocin (BACTROBAN) 2% nasal ointment OINT 1 Application, 1 Roman of the right humerus. The glenohumeral articulation is maintained. 2. Borderline widening of the acromioclavicular interval, which may suggest associated low-grade acromioclavicular joint injury. 3. Calcific tendinitis of the right rotator cuff.  4. Deminer

## 2018-06-20 NOTE — OCCUPATIONAL THERAPY NOTE
OCCUPATIONAL THERAPY TREATMENT NOTE - INPATIENT    Room Number: 403/403-A         Presenting Problem: s/p fall with R proximal humeral fracture     Problem List  Principal Problem:    Closed displaced fracture of surgical neck of right humerus, unspecified Recommendations: None    PLAN  OT Treatment Plan: Balance activities; ADL training;Functional transfer training;Patient/Family education;Patient/Family training; Compensatory technique education    SUBJECTIVE  \"I have been hearing so much\" re: education pr Goals  Patient self-stated goal is: to return home      Patient will complete UE dressing Mod I   Comment: Max A     Patient will complete toilet transfer Mod I   Comment:  Min A     Patient will complete LE dressing Mod I   Comment:NT    Patient will comp

## 2018-06-20 NOTE — PROGRESS NOTES
[facility]    Progress Note    Chu Diaz Patient Status:  Observation    3/29/1940 MRN [id number]   Location [facility] 4W/SW/SE Attending Bruce Stockton MD   Hosp Day # 1 PCP Steven Nath MD       Subjective:   Shey Hernandez HYDROcodone-acetaminophen (NORCO) 5-325 MG per tab 1 tablet, 1 tablet, Oral, Q4H PRN **OR** HYDROcodone-acetaminophen (NORCO) 5-325 MG per tab 2 tablet, 2 tablet, Oral, Q4H PRN  •  docusate sodium (COLACE) cap 100 mg, 100 mg, Oral, BID  •  PEG 3350 (Garnette Liner improvement  Serum and urine osm's suggesting SIADH  Will restrict fluids and monitor     UTI  Evident on UA  Will start on rocephin, ucx pending  With leukocytosis    HTN  Will cont home meds     DVT PPx: Heparin sq  Full Code    Dispo: will need DC to SN change in  appearance since 3/7/18. The esophagus is distended with gas and fluid which could represent changes of gastroesophageal reflux or dysmotility.   Given the change in the appearance of the gastroesophageal junction since 3/7/18 recommend further

## 2018-06-20 NOTE — PLAN OF CARE
CARDIOVASCULAR - ADULT    • Maintains optimal cardiac output and hemodynamic stability Progressing    • Absence of cardiac arrhythmias or at baseline Progressing    Pt's remote tele#50, NSR. HR controlled with metoprolol bid.      DISCHARGE PLANNING    • Maria Alejandra Lopez bruising present. No other skin issues noted. CIWA dc'd as per protocol d/t [id number]. Continue heparin and scd's to carlos le for VTE prrophylaxis. Awaiting GI consult.

## 2018-06-20 NOTE — PHYSICAL THERAPY NOTE
PHYSICAL THERAPY TREATMENT NOTE - INPATIENT     Room Number: 403/403-A       Presenting Problem: closed displaced fx of surgical neck of R humerus    Problem List  Principal Problem:    Closed displaced fracture of surgical neck of right humerus, unspecifi Static Sitting: Good  Dynamic Sitting: Good           Static Standing: Fair +  Dynamic Standing: Fair    ACTIVITY TOLERANCE   % SAT AT REST AND 90% after amb 114 HR    A Status Amb 300 ft w/ SPC & Min A   Goal #4 Patient to demonstrate independence with home activity/exercise instructions provided to patient in preparation for discharge.    Goal #4   Current Status IN PROGRESS   Goal #5     Goal #5   Current Status     Goal

## 2018-06-20 NOTE — CM/SW NOTE
[address] is not able to accept the pt at this time, but [address] [place] [address]  JAVIER notified the pt's [facility].  --------------------------------------------  JAVIER received a call from the pt's [facility] Nabeel Phillips who stated their first choice

## 2018-06-21 NOTE — PLAN OF CARE
CARDIOVASCULAR - ADULT    • Maintains optimal cardiac output and hemodynamic stability Progressing    • Absence of cardiac arrhythmias or at baseline Progressing        DISCHARGE PLANNING    • Discharge to home or other facility with appropriate resources restriction of 1500ml maintained, call light and belongings in reach, needs attended to.

## 2018-06-21 NOTE — CONSULTS
Magali Wallace 89  Report of GI Consultation    [facility] Patient Status:  Inpatient    3/29/1940 MRN [id number]   Location [facility] 4W/SW/SE Attending Raina Linton MD   Hosp Day # 1 PCP Alisha Peres MD     Date of Admis consult to me the GI position. Mr. Jason Sam states that his stomach used to be up in the thorax, \"next to my heart. \"  He describes having a thoracic surgery, apparently entering from the back to pull the stomach back down into the abdomen.   That likely e secretions, air in the esophagus. Esophageal malignancy is also a possibility. I recommended to Mr Deondre Moser an EGD examination to further evaluate these findings and rule out esophageal neoplasm or stricture.   History of previous smoking, alcohol consumpt Flush 0.9 % injection 3 mL 3 mL Intravenous PRN   acetaminophen (TYLENOL) tab 650 mg 650 mg Oral Q4H PRN   Or      HYDROcodone-acetaminophen (NORCO) 5-325 MG per tab 1 tablet 1 tablet Oral Q4H PRN   Or      HYDROcodone-acetaminophen (NORCO) 5-325 MG per ta a sling  HEENT: Multiple ecchymoses forehead around the eyes and nose; pupils equally round and reactive to light; no temporal wasting; no thyromegaly or cervical lymphadenopathy  PULM: Lungs clear to auscultation anteriorly  CV: RRR  ABD: Normoactive abbey thickening of the distal esophagus/gastroesophageal junction with surrounding metallic clips/sutures. Findings could represent changes of hiatal hernia repair however recurrent hiatal hernia is suspected. This is a change in  appearance since 3/7/18.   Hill Collazo

## 2018-06-21 NOTE — PROGRESS NOTES
[facility]    Progress Note    Philipgadiel Hutchins Patient Status:  Observation    3/29/1940 MRN [id number]   Location [facility] 4W/SW/SE Attending Jus Anderson MD   Hosp Day # 2 PCP Maciej Michel MD       Subjective:   Manuel Said (NORCO) 5-325 MG per tab 1 tablet, 1 tablet, Oral, Q4H PRN **OR** HYDROcodone-acetaminophen (NORCO) 5-325 MG per tab 2 tablet, 2 tablet, Oral, Q4H PRN  •  docusate sodium (COLACE) cap 100 mg, 100 mg, Oral, BID  •  PEG 3350 (MIRALAX) powder packet 17 g, 17 improvement  Serum and urine osm's suggesting SIADH  Will restrict fluids and monitor   Will consult Dr. Olga Craig for further evaluation and management    UTI  Evident on UA  Will start on rocephin, ucx pending  With leukocytosis  Await final cx    HTN  cont gastroesophageal reflux or dysmotility. Given the change in the appearance of the gastroesophageal junction since 3/7/18 recommend further assessment with upper endoscopy.  5.  Nonspecific fat stranding involving the subcutaneous fat of the chest wall main

## 2018-06-22 NOTE — PHYSICAL THERAPY NOTE
PHYSICAL THERAPY TREATMENT NOTE - INPATIENT     Room Number: 403/403-A       Presenting Problem: closed displaced fx of surgical neck of R humerus    Problem List  Principal Problem:    Closed displaced fracture of surgical neck of right humerus, unspecifi Extremity: Non-Weight Bearing             PAIN ASSESSMENT   Rating: 3  Location: rt shld  Management Techniques: Activity promotion; Body mechanics    BALANCE cane - quad      Goal #2  Current Status Min  A   Goal #3 Patient is able to ambulate 150 feet with assist device: cane - quad at assistance level: modified independent   Goal #3   Current Status Amb 300 ft w/ [facility] & Min A   Goal #4 Patient to demonstrate i

## 2018-06-22 NOTE — OCCUPATIONAL THERAPY NOTE
OCCUPATIONAL THERAPY TREATMENT NOTE - INPATIENT    Room Number: 403/403-A         Presenting Problem: s/p fall with R proximal humeral fracture     Problem List  Principal Problem:    Closed displaced fracture of surgical neck of right humerus, unspecified education    SUBJECTIVE  \"I am more comfortable in the chair\"    OBJECTIVE  Precautions: Limb alert - right;Bed/chair alarm    WEIGHT BEARING RESTRICTION  Weight Bearing Restriction: R upper extremity  R Upper Extremity: Non-Weight Bearing         ACTIVI Mod I   Comment: Min A          Goals  on: 18  Frequency: 3-5x week (OBS)    Irina Burrell, OTR/L   5 [facility]

## 2018-06-22 NOTE — PLAN OF CARE
CARDIOVASCULAR - ADULT    • Maintains optimal cardiac output and hemodynamic stability Progressing    • Absence of cardiac arrhythmias or at baseline Progressing        DISCHARGE PLANNING    • Discharge to home or other facility with appropriate resources with 2 skin tears to L arm, mepilex dressing in place, c/d/i, generalized bruising, sling on at all times, Rocephin for UTI, voiding freely, tolerating general diet,  Call light and belongings in reach, needs attended to.

## 2018-06-22 NOTE — CONSULTS
[facility]    PATIENT'S NAME: Minoo Favorite   ATTENDING PHYSICIAN: Allyson Atwood MD   CONSULTING PHYSICIAN: Yony Ruiz MD   PATIENT ACCOUNT#:   [id number]    LOCATION:  [address] [place] [address] #:   [id number]       DATE OF BIRTH: He was on losartan at home but that is being held. He was not taking diuretics to my knowledge. SOCIAL HISTORY:  He is a former smoker but quit many years ago in the [employer], and drinks about 4 to 6 drinks a day of beer.   He is retired, he worked for which really did not help. He could have syndrome of inappropriate secretion of antidiuretic hormone. We will check a serum uric acid level to see if it is very low, which would go along with SIADH. We will continue with fluid restriction.   We will chec

## 2018-06-22 NOTE — PROGRESS NOTES
[facility] - [facility]    Progress Note    [facility] Patient Status:  Inpatient    3/29/1940 MRN [id number]   Location [facility] 4W/SW/SE Attending Ady Sylvester MD   Hosp Day # 3 PCP Kandice Oswald MD       Subjective:   Jus Her present, no abnormal bruising noted  Back/Spine: no abnormalities noted  Musculoskeletal: full ROM all extremities good strength  no deformities  Extremities: 1+edema  R arm in sling  Neurological:  Grossly normal    Results:     Laboratory Data:    Lab Re

## 2018-06-22 NOTE — PROGRESS NOTES
[facility]    Progress Note    Jaida York Patient Status:  Observation    3/29/1940 MRN [id number]   Location [facility] 4W/SW/SE Attending Alexi Membreno MD   Hosp Day # 3 PCP Alma Delia Sousa MD       Subjective:   Darby De Los Santos HYDROcodone-acetaminophen (NORCO) 5-325 MG per tab 1 tablet, 1 tablet, Oral, Q4H PRN **OR** HYDROcodone-acetaminophen (NORCO) 5-325 MG per tab 2 tablet, 2 tablet, Oral, Q4H PRN  •  docusate sodium (COLACE) cap 100 mg, 100 mg, Oral, BID  •  PEG 3350 (Shala Landau now   Monitor Na daily     Severe malnutrition  Patient has no appetite   After his wife  he states he does not feel hungry  Nutrition consult     Alcohol abuse  Counseled patient to quit  States he drinks 3-4 beers a day  Will add thiamine and folic a

## 2018-06-23 NOTE — DIETARY NOTE
ADULT NUTRITION INITIAL ASSESSMENT    Pt is at high nutrition risk. Pt meets malnutrition criteria.       CRITERIA FOR MALNUTRITION DIAGNOSIS:  Criteria for non-severe malnutrition diagnosis: chronic illness related to energy intake less than75% for greate beer at home. Rarely has a shot or wine. Seems to have decreased intake since Wife passed away '16 as no longer has meals prepared. Pt denies a decrease in appetite. Observed at lunch intake good however no meat/main entree.  Pt is very picky(states himself edema.   BMI: Body mass index is 22.32 kg/m². BMI CLASSIFICATION: 19-24.9 kg/m2 - WNL  IBW: 152 lbs        93% IBW  Usual Body Wt: 171 lbs       83 % UBW over past 4 years. And 12% wt loss over just past 1 year.    WEIGHT HISTORY:  Patient Weight(s) for th depletion body fat triceps region and fat overlying rib cage region and mild depletion muscle mass clavicle region, scapular region, shoulder region, dorsal pollard region and thigh region per visual exam.    - Fluid Accumulation: upper extremity edema and

## 2018-06-23 NOTE — PHYSICAL THERAPY NOTE
PHYSICAL THERAPY TREATMENT NOTE - INPATIENT     Room Number: 403/403-A       Presenting Problem: closed displaced fx of surgical neck of R humerus    Problem List  Principal Problem:    Closed displaced fracture of surgical neck of right humerus, unspecifi (including adjusting bedclothes, sheets and blankets)?: A Little   -   Sitting down on and standing up from a chair with arms (e.g., wheelchair, bedside commode, etc.): A Little   -   Moving from lying on back to sitting on the side of the bed?: A Little     Goal #5   Current Status     Goal #6     Goal #6  Current Status

## 2018-06-23 NOTE — PROGRESS NOTES
[facility]    Progress Note    Orestes Evangelista Patient Status:  Inpatient    3/29/1940 MRN [id number]   Location [facility] 4W/SW/SE Attending Rosalva Diaz, [address] [facility] Se Day # 4 PCP Zaira Robins MD       Subjective:   Tiff Robert abnormal bruising noted  Back/Spine: no abnormalities noted  Musculoskeletal: full ROM all extremities good strength  no deformities  Extremities:R arm in sling  Neurological:  Grossly normal    Results:     Laboratory Data:    Lab Results  Component Value

## 2018-06-23 NOTE — PLAN OF CARE
DISCHARGE PLANNING    • Discharge to home or other facility with appropriate resources Progressing    Possible discharge to [address] [place] [address].     PAIN - ADULT    • Verbalizes/displays adequate comfort level or patient's stated pain goal Progressi

## 2018-06-24 NOTE — PLAN OF CARE
HEMATOLOGIC - ADULT    • Maintains hematologic stability Progressing    • Free from bleeding injury Yari Stage is on heparin for dvt prophylaxis.  Scds in place    MUSCULOSKELETAL - ADULT    • Return mobility to safest level of function Progress

## 2018-06-24 NOTE — PLAN OF CARE
METABOLIC/FLUID AND ELECTROLYTES - ADULT    • Electrolytes maintained within normal limits Not Progressing          CARDIOVASCULAR - ADULT    • Maintains optimal cardiac output and hemodynamic stability Progressing    • Absence of cardiac arrhythmias or at Mepilex to L arm skin tear. Na still low. Pt reported excess diaphoresis and feeling dizzy, vitals stable. Negative Trop. Ativan given.

## 2018-06-24 NOTE — PHYSICAL THERAPY NOTE
PHYSICAL THERAPY TREATMENT NOTE - INPATIENT     Room Number: 403/403-A       Presenting Problem: closed displaced fx of surgical neck of R humerus    Problem List  Principal Problem:    Closed displaced fracture of surgical neck of right humerus, unspecifi MOBILITY  How much difficulty does the patient currently have. ..  -   Turning over in bed (including adjusting bedclothes, sheets and blankets)?: A Little   -   Sitting down on and standing up from a chair with arms (e.g., wheelchair, bedside commode, etc. Status     Goal #6     Goal #6  Current Status

## 2018-06-24 NOTE — PROGRESS NOTES
[place] [facility]    Progress Note    Erenest Spare Patient Status:  Inpatient    3/29/1940 MRN [id number]   Location [facility] 4W/SW/SE Attending Zamzam Pedersen MD   [address] Day # 5 PCP Amando Gilliam MD       Subjective:   Diamond Agustin Constantin abnormal bruising noted  Back/Spine: no abnormalities noted  Musculoskeletal: full ROM all extremities good strength  no deformities  Extremities: R arm in sling  Neurological:  Grossly normal    Results:     Laboratory Data:    Lab Results  Component Valu

## 2018-06-24 NOTE — PROGRESS NOTES
[facility]    Progress Note    Angel Fernandez Patient Status:  Inpatient    3/29/1940 MRN [id number]   Location [facility] 4W/SW/SE Attending Rohit Valladares MD   Hosp Day # 5 PCP Koby Cummings MD       Subjective:     Patient evaluation, appreciate evaluation  Pt deferring endoscopy at this time, would like to pursue as OP     Large ventral Abd hernia  Was told that does not need to get this fixed unless he starts having pain  Denies any pain currently  Cont to monitor  OP gen

## 2018-06-25 NOTE — CM/SW NOTE
6/25CM-The Patient's RN informed this Writer that the Patient is not medically stable for discharge today (6/25). This Writer has informed [employer] of the above. Case Management to set up the Patient's discharge when medically stable.

## 2018-06-25 NOTE — PHYSICAL THERAPY NOTE
PHYSICAL THERAPY TREATMENT NOTE - INPATIENT     Room Number: 403/403-A       Presenting Problem: closed displaced fx of surgical neck of R humerus    Problem List  Principal Problem:    Closed displaced fracture of surgical neck of right humerus, unspecifi Poor +    ACTIVITY TOLERANCE  Room air    AM-PAC '6-Clicks' INPATIENT SHORT FORM - BASIC MOBILITY  How much difficulty does the patient currently have. ..  -   Turning over in bed (including adjusting bedclothes, sheets and blankets)?: A Little   -   Sittin preparation for discharge.    Goal #4   Current Status IN PROGRESS   Goal #5     Goal #5   Current Status     Goal #6     Goal #6  Current Status

## 2018-06-25 NOTE — OCCUPATIONAL THERAPY NOTE
OCCUPATIONAL THERAPY TREATMENT NOTE - INPATIENT    Room Number: 403/403-A         Presenting Problem: s/p fall with R proximal humeral fracture     Problem List  Principal Problem:    Closed displaced fracture of surgical neck of right humerus, unspecified care/supervision;Cont skilled therapy in a supervised setting  OT Device Recommendations: None    PLAN  OT Treatment Plan: Balance activities; ADL training;Functional transfer training;Patient/Family education;Patient/Family training; Compensatory technique OT Goals  Patient self-stated goal is: to return home      Patient will complete UE dressing Mod I   Comment: Max A     Patient will complete toilet transfer Mod I   Comment:  Min A     Patient will complete LE dressing Mod I   Comment:NT    Patient wi

## 2018-06-25 NOTE — PROGRESS NOTES
[facility]    Progress Note    Howie Rodriguez Patient Status:  Inpatient    3/29/1940 MRN [id number]   Location [facility] 4W/SW/SE Attending Tomas Pa MD   [address] Day # 6 PCP Leanna Merchant MD       Subjective:   Pat Hernandez Constantin abnormal bruising noted  Back/Spine: no abnormalities noted  Musculoskeletal: full ROM all extremities good strength  no deformities  Extremities: R arm in sling  Neurological:  Grossly normal    Results:     Laboratory Data:    Lab Results  Component Valu

## 2018-06-26 NOTE — PROGRESS NOTES
[facility]  Nephrology Daily Progress Note    Imelda Mena  [id number]  69year old  Patient presents with:  Fall (musculoskeletal, neurologic)      HPI:   Imelda Mena is a 69year old male. Still having problems ambulating.  Has some GRAMAJO.m No CP strength  no deformities  Extremities: 2 + LE edema bilat.   Neurological: exam appropriate for age reflexes and motor skills appropriate for age    Labs:  CBC results over the last 6 months:    Recent Labs   06/24/18  0435   WBC 7.5   RBC 3.05*   HGB 9.0* Metabolic Panel (8)      Potassium      Magnesium      MD BLOOD SMEAR CONSULT      Osmolality, Urine Once      Sodium, Urine, Random Once      D-Dimer      CBC With Differential With Platelet      Basic Metabolic Panel (8)      Comp Metabolic Panel (14)

## 2018-06-26 NOTE — CM/SW NOTE
SW confirmed w/ Holland Morillo from [facility] that they have a bed available for pt once medically stable for discharge. RN/Becka has been updated.      Rossy Ojeda, [address]

## 2018-06-26 NOTE — PHYSICAL THERAPY NOTE
PHYSICAL THERAPY TREATMENT NOTE - INPATIENT     Room Number: 403/403-A       Presenting Problem: closed displaced fx of surgical neck of R humerus    Problem List  Principal Problem:    Closed displaced fracture of surgical neck of right humerus, unspecifi -    ACTIVITY TOLERANCE  Room air    AM-PAC '6-Clicks' INPATIENT SHORT FORM - BASIC MOBILITY  How much difficulty does the patient currently have. ..  -   Turning over in bed (including adjusting bedclothes, sheets and blankets)?: A Lot   -   Sitting down o provided to patient in preparation for discharge.    Goal #4   Current Status IN PROGRESS   Goal #5     Goal #5   Current Status     Goal #6     Goal #6  Current Status

## 2018-06-27 NOTE — PROGRESS NOTES
[facility]    Progress Note    Jaida York Patient Status:  Inpatient    3/29/1940 MRN [id number]   Location [facility] 4W/SW/SE Attending Valentino Boss MD   Hosp Day # 8 PCP Alma Delia Sousa MD       Subjective:     Patient contusion  Due to mechanical fall  CTH non-acute   Wounds cleaned in ER  Cont to monitor  No SOB     GRAMAJO, tachycardia  RESOLVED  With elevated d-dimer  CT PE neg   Tachycardia resolved after restarting home BB  Cont to monitor on tele     Distal esophageal

## 2018-06-27 NOTE — PLAN OF CARE
Problem: Patient/Family Goals  Goal: Patient/Family Long Term Goal  Patient's Long Term Goal: Go to my own home    Interventions:  -rehab when stable  - sling on at all times per order  - Rocephin for UTI   Outcome: Progressing    Goal: Patient/Family Christian Deleon assistive devices as appropriate  - Consider OT/PT consult to assist with strengthening/mobility  - Encourage toileting schedule   Outcome: Progressing      Problem: DISCHARGE PLANNING  Goal: Discharge to home or other facility with appropriate resources arrhythmias  - Monitor electrolytes and administer replacement therapy as ordered   Outcome: Progressing      Problem: SKIN/TISSUE INTEGRITY - ADULT  Goal: Incision(s), wounds(s) or drain site(s) healing without S/S of infection  INTERVENTIONS:  - Assess a limits  INTERVENTIONS:  - Monitor labs and rhythm and assess patient for signs and symptoms of electrolyte imbalances  - Administer electrolyte replacement as ordered  - Monitor response to electrolyte replacements, including rhythm and repeat lab results

## 2018-06-27 NOTE — PHYSICAL THERAPY NOTE
PHYSICAL THERAPY TREATMENT NOTE - INPATIENT     Room Number: 403/403-A       Presenting Problem: closed displaced fx of surgical neck of R humerus    Problem List  Principal Problem:    Closed displaced fracture of surgical neck of right humerus, unspecifi air    AM-PAC '6-Clicks' INPATIENT SHORT FORM - BASIC MOBILITY  How much difficulty does the patient currently have. ..  -   Turning over in bed (including adjusting bedclothes, sheets and blankets)?: A Lot   -   Sitting down on and standing up from a chair Current Status IN PROGRESS   Goal #5     Goal #5   Current Status     Goal #6     Goal #6  Current Status

## 2018-06-27 NOTE — OCCUPATIONAL THERAPY NOTE
OCCUPATIONAL THERAPY TREATMENT NOTE - INPATIENT    Room Number: 522/522-A         Presenting Problem: s/p fall with R proximal humeral fracture     Problem List  Principal Problem:    Closed displaced fracture of surgical neck of right humerus, unspecified ASSESSMENT  Ratin  Location:  (RUE)  Management Techniques:  Activity promotion;Repositioning     ACTIVITY TOLERANCE  O2 Saturation: 96%  Room air  Heart Rate: 112    ACTIVITIES OF DAILY LIVING ASSESSMENT  AM-PAC ‘6-Clicks’ Inpatient Daily Activity Kevin Ferrell  on: 18  Frequency: 3-5x week (OBS)

## 2018-06-27 NOTE — PLAN OF CARE
Problem: PAIN - ADULT  Goal: Verbalizes/displays adequate comfort level or patient's stated pain goal  INTERVENTIONS:  - Encourage pt to monitor pain and request assistance  - Assess pain using appropriate pain scale  - Administer analgesics based on type function  INTERVENTIONS:  - Assess patient stability and activity tolerance for standing, transferring and ambulating w/ or w/o assistive devices  - Assist with transfers and ambulation using safe patient handling equipment as needed  - Ensure adequate pro

## 2018-06-28 PROBLEM — E46 MALNUTRITION (HCC): Status: ACTIVE | Noted: 2018-01-01

## 2018-06-28 NOTE — PROGRESS NOTES
[facility]    Progress Note    Magnolia Hutchins Patient Status:  Inpatient    3/29/1940 MRN [id number]   Location [facility]5W Attending Jose Munoz MD   [facility] Day # 9 PCP Maciej Michel MD       Subjective:   Magnolia Hutchins is a bruising noted  Back/Spine: no abnormalities noted  Musculoskeletal: full ROM all extremities good strength  no deformities  Extremities:R arm in sling, 1= pedal edema  Neurological:  Grossly normal    Results:     Laboratory Data:    Lab Results  Componen

## 2018-06-28 NOTE — CM/SW NOTE
JAVIER spoke with MD who states pt is stable for dc to snf today. JAVIER confirmed bed at [facility]. Pt is on room air, no isolation. As pt was ambulatory x 30 feet yesterday, an ambulance would not be covered.  Anamika Zamora arranged for today 6/28 at 1:30pm to [facility]

## 2018-06-28 NOTE — PHYSICAL THERAPY NOTE
PHYSICAL THERAPY TREATMENT NOTE - INPATIENT     Room Number: 403/403-A       Presenting Problem: closed displaced fx of surgical neck of R humerus    Problem List  Principal Problem:    Closed displaced fracture of surgical neck of right humerus, unspecifi Fair  Dynamic Standing: Poor +    ACTIVITY TOLERANCE  Room air    AM-PAC '6-Clicks' INPATIENT SHORT FORM - BASIC MOBILITY  How much difficulty does the patient currently have. ..  -   Turning over in bed (including adjusting bedclothes, sheets and blankets) in preparation for discharge.    Goal #4   Current Status IN PROGRESS   Goal #5     Goal #5   Current Status     Goal #6     Goal #6  Current Status

## 2018-06-28 NOTE — PLAN OF CARE
Problem: Patient/Family Goals  Goal: Patient/Family Long Term Goal  Patient's Long Term Goal: Go to my own home    Interventions:  -rehab when stable  - sling on at all times per order  - Rocephin for UTI   Outcome: Progressing    Goal: Patient/Family Bj Donovan

## 2018-06-28 NOTE — OCCUPATIONAL THERAPY NOTE
OCCUPATIONAL THERAPY TREATMENT NOTE - INPATIENT    Room Number: 522/522-A         Presenting Problem: s/p fall with R proximal humeral fracture     Problem List  Principal Problem:    Closed displaced fracture of surgical neck of right humerus, unspecified Extremity: Non-Weight Bearing             PAIN ASSESSMENT  Ratin  Location:  (RUE)  Management Techniques: Repositioning     ACTIVITY TOLERANCE  O2 Saturation: 92%  Liters of O2:  3    ACTIVITIES OF DAILY LIVING ASSESSMENT  AM-PAC ‘6-Clicks’ Inpatient 6/23/18  Frequency: 3-5x week (OBS)

## 2018-06-28 NOTE — PLAN OF CARE
Problem: Patient/Family Goals  Goal: Patient/Family Short Term Goal  Patient's Short Term Goal: I want to get out of the hospital    Interventions:   - Sling on at all times  - Renal on consult for low Sodium levels  - referral to [facility] for strengthening/mobility  - Encourage toileting schedule   Outcome: Adequate for Discharge  Fall precautions maintained at all times; bed/chair alarms active/audible; non skid socks in place; up to bathroom +2 person assist; call light within reach; frequent balance, assess for edema, trend weights   Outcome: Progressing      Problem: SKIN/TISSUE INTEGRITY - ADULT  Goal: Incision(s), wounds(s) or drain site(s) healing without S/S of infection  INTERVENTIONS:  - Assess and document risk factors for pressure ulc

## 2018-06-28 NOTE — DISCHARGE SUMMARY
[facility]    Discharge Summary    Carvin Organ Patient Status:  Inpatient    3/29/1940 MRN [id number]   Location [facility]5W Attending Kalyn Valle MD   Hosp Day # 9 PCP Mary Ellis MD     Date of Admission:  CTH on arrival was non-acute however R shoulder x-ray showed mildly displaced fracture of humerus. Pt will be admitted for further monitoring and evaluation. Orthopedic surgery was consulted in ER.     Hospital Course:     Facial contusion  Due to mechanica details   folic acid 1 MG Tabs  Commonly known as:  Larina Shells  Start taking on:  6/29/2018      Take 1 tablet (1 mg total) by mouth daily.    Quantity:  30 tablet  Refills:  0     Pantoprazole Sodium 40 MG Tbec  Commonly known as:  PROTONIX  Start taking on:

## 2018-06-28 NOTE — PROGRESS NOTES
[facility]    Progress Note    Chu Diaz Patient Status:  Inpatient    3/29/1940 MRN [id number]   Location [facility] Attending No att. providers found   Hosp Day # 9 PCP Steven Nath MD       Subjective:   Chu Diaz abnormal bruising noted  Back/Spine: no abnormalities noted  Musculoskeletal: full ROM all extremities good strength  no deformities  Extremities: R arm in sling  Neurological:  Grossly normal    Results:     Laboratory Data:    Lab Results  Component Valu

## 2018-07-02 NOTE — PROGRESS NOTES
Magnolia Hutchins  : 3/29/1940  Age 69year old  male patient is admitted to [place] [address] 18 for rehab and strengthening     Chief complaint: Syncope and collapse ,Hyponatremia,Contusion of face,Abrasion of face,Blunt head injury,Closed displaced fra be able to go home. Plan is for discharge 7/16/18.      Past Medical History:   Diagnosis Date   • Calculus of kidney    • H/O blood clots     found in Lt leg during a stress test   • Right wrist fracture 2013    per [employer]:    • Unspecified essential hyp 600-200 MG-UNIT Oral Tab Take  by mouth. Disp:  Rfl:    aspirin 81 MG Oral Tab Take 81 mg by mouth daily.  Disp:  Rfl:        VITALS:  /65   Pulse 94   Temp 97.8 °F (36.6 °C) (Tympanic)   Resp 20   Wt 158 lb (71.7 kg)   SpO2 97%   BMI 24.75 kg/m² distension  LYMPHATIC:no lymphedema  MUSCULOSKELETAL: no acute synovitis upper or lower extremity  EXTREMITIES/VASCULAR:no cyanosis, clubbing or edema  NEUROLOGIC: follows commands  PSYCHIATRIC: alert and oriented x 3; affect appropriate      MEDICAL DECIS to eval and follow     7. Alcohol abuse  -Counseled patient to quit while at [address] he drinks 3-4 beers a day  Cont thiamine and folic acid      8. UTI  -Evident on UA at [address]   -Had leukocytosis - improved  -Completed  abx     9.  HTN  -Cont metoprolo

## 2018-07-13 NOTE — PROGRESS NOTES
Anniemaximoalessandro Lira, 3/29/1940, 69year old, male at [place] [address] 6/28/18 for rehab and strengthening      Chief complaint: Syncope and collapse ,Hyponatremia,Contusion of face,Abrasion of face,Blunt head injury,Closed displaced fracture of surgical neck of ri renal panel for today: as  Reviewed today: 7/12/18  BUN 33  Glucose 100    K 5.2  Chloride 100  CO2 25  CA 8.3 PHOS 4.1  2.2 Albumin eGFR >60      Objective:  BP 92/53   Pulse 96   Temp 96.9 °F (36.1 °C)   Resp 16     PHYSICAL EXAM:  GENERAL HEALTH: the Rehab: continue on BB     3.  Distal esophageal thickening  -With hx of hernia repair, now distended with gas and fluid  -Consulted Dr. Rodolfo Frye for further evaluation, appreciate evaluation  -Pt deferring endoscopy at this time, would like to pursue as O

## 2018-07-16 NOTE — PROGRESS NOTES
Chu Diaz  : 3/29/1940  Age 69year old  male patient is admitted to [facility] 18 for rehab and strengthening     [address] [place] [address] Admission : 18 to 18    Chief complaint: Syncope and collapse ,Hyponatremia,Contusion of face,Abrasion today , skin excoriation still present, is not a bedsore, applied a moisture barrier ointment. Will ocnt to monitor.       ALLERGIES:  No Known Allergies    CODE STATUS:  Full Code    ADVANCED CARE PLANNING TEAM: discharge 7/23/18 but lives alone , recommen chest pain, no palpitations   GI: denies nausea, vomiting, constipation, diarrhea; no rectal bleeding; no heartburn  :no dysuria, urgency or frequency; no epididymal or testicular pain; no penile discharge; no hernias; no nocturia: no hematuria  MUSCULOS tachycardia  -RESOLVED, still monitoring  -With elevated d-dimer  -CT PE neg  While in hospital   -Tachycardia resolved after restarting home meds  -was on tele at [facility]  -Cardiology Consult, to eval and treat in the Rehab: continue on BB     3. Distal esoph excoriation and a skin barrier applied and will monitor     This is a 25 minute visit and greater than 50% of the time was spent counseling the patient and/or coordinating care.     CHRISTINE Looney  07/16/18   4:01 PM

## 2018-07-20 NOTE — PROGRESS NOTES
Imelda Mena  : 3/29/1940  Age 69year old  male patient is admitted to [facility] 18 for rehab and strengthening     Admitted to [facility] on: 18 to 18  Chief complaint: Syncope and collapse ,Hyponatremia,Contusion of face,A progress but is trying he reports. Skin excoriation noted on left buttock, Wound RN monitoring , skin excoriation still present but improving , is not a bedsore, applied a moisture barrier ointment. Will cont to monitor.   Labs reviewed from toay 7/20/18: well otherwise, fragile male   SKIN: denies any unusual skin lesions or rashes  WOUNDS: none, skin dry  EYES:no visual complaints or deficits  HENT: denies nasal congestion, sinus pain or sore throat;  RESPIRATORY: denies shortness of breath, wheezing or c 3; affect appropriate     Medications reviewed      SEE PLAN BELOW  1.  Facial contusion  -Due to mechanical fall  -CTH non-acute   -Wound care as prescribed   -Cont to monitor  -fall risk, precautions in place      2. GRAMAJO, tachycardia  -RESOLVED, still mon at [facility]   -Had leukocytosis - improved  -Completed  abx     9. HTN  -Cont metoprolol  -VS q shift      10. excoriation on BUTTOCK  - wound RN following and reports it continues to be skin excoriation and a skin barrier applied and will monitor  -new skin tea

## 2018-07-23 NOTE — PROGRESS NOTES
Philipgadiel Cuong  : 3/29/1940  Age 69year old  male patient is admitted to [facility]  18 for rehab and strengthening      Admitted to Hospital : 18 to 18  Chief complaint: Syncope and collapse ,Hyponatremia,Contusion of face,Abrasion o is elevated pillow, pt had some swelling in RUE and BLE, cont on Lasix, and followed  by cards who also recommended to continue PO Lasix QD and to monitor K to stay above 4.0 and Mg above 2.0, but BP needs to be monitored, can go low at times.   Patient  al SILVER ULTRA MENS) Oral Tab Take  by mouth. Disp:  Rfl:    Calcium Carb-Cholecalciferol (CALCIUM + D3) 600-200 MG-UNIT Oral Tab Take  by mouth. Disp:  Rfl:    aspirin 81 MG Oral Tab Take 81 mg by mouth daily.  Disp:  Rfl:        VITALS:  /65   Pulse 7 auscultation  CARDIOVASCULAR: S1, S2 normal, RRR; no S3, no S4;   ABDOMEN:  normal active BS+, soft, nondistended; no organomegaly, no masses; no bruits; nontender, no guarding, no rebound tenderness.   :no suprapubic distension  LYMPHATIC:RUE edema and t IV while in hospital   -Na improved to 131  - nephrology followed in hospital.   -monitor cbc and bmp , at least weekly in rehab   6. Severe malnutrition  -reports he does  eat throughout day bur reports  no appetite   -Nutrition consult at [address] [place] [address], followed by

## 2018-07-24 NOTE — PROGRESS NOTES
Nemo Figueroa  : 3/29/1940  Age 69year old  male patient is admitted to [facility] 18 for rehab and strengthening     Admitted to [facility] on: 18 to 18    Chief complaint: Syncope and collapse ,Hyponatremia,Contusion of face making other options like a 24 hr caregiver or LTC. Care plan long , over 1 hr and patient extended til next week due to new skin breakdown coccyx area. Dressing changes will be done daily to coccyx area with a hydrogel dressing.   Was seen by Psych last we Disp: 30 tablet Rfl: 0   losartan 100 MG Oral Tab Take 0.5 tablets (50 mg total) by mouth 2 (two) times daily. Disp: 60 tablet Rfl: 6   metoprolol Tartrate 25 MG Oral Tab Take 1 tablet (25 mg total) by mouth 2 (two) times daily.  Disp: 60 tablet Rfl: 6   M aplied  EYES: PERRLA, EOMI, sclera anicteric, conjunctiva normal; there is no nystagmus, no drainage from eyes  HENT: normocephalic; normal nose, no nasal drainage, mucous membranes pink, moist, pharynx no exudate, no visible cerumen.   NECK: supple; FROM; arm and use ice packs as needed , presently sling off and elevated on pillow     5. Hyponatremia  -Improved with tolvaptan.   - In the setting of etoh use, dehydration, ?beer potomania versus SIADH?  -No improvement with IV hydration   -Serum and urine osm's spent counseling the patient and/or coordinating care.     CHRISTINE Urena  07/24/18   2:14 PM

## 2018-07-27 NOTE — PROGRESS NOTES
Martina Box  : 3/29/1940  Age 69year old  male patient is admitted to [facility] DOUG 18 for rehab and strengthening     Admitted to [facility] AND [facility] on: 18 to 18    Chief complaint: Syncope and collapse ,Hyponatremia,Contusion of face plan long , over 1 hr and patient extended til next week due to new skin breakdown coccyx area. Dressing changes will be done daily to coccyx area with a hydrogel dressing. Was seen by Psych last week and patient family requesting the report.   Saw Dr. Live Sage tablets (50 mg total) by mouth 2 (two) times daily. Disp: 60 tablet Rfl: 6   metoprolol Tartrate 25 MG Oral Tab Take 1 tablet (25 mg total) by mouth 2 (two) times daily.  Disp: 60 tablet Rfl: 11   Magnesium Chloride ER (MAG64) 535 (64 MG) MG Oral Tab CR Dominic there is no nystagmus, no drainage from eyes  HENT: normocephalic; normal nose, no nasal drainage, mucous membranes pink, moist, pharynx no exudate, no visible cerumen.   NECK: supple; FROM; no JVD, no TMG, no carotid bruits  BREAST: no masses, no nipple di presently sling off and elevated on pillow     5. Hyponatremia  -Improved with tolvaptan.   - In the setting of etoh use, dehydration, ?beer potomania versus SIADH?  -No improvement with IV hydration   -Serum and urine osm's suggesting SIADH  -Renal on consu visit and greater than 50% of the time was spent counseling the patient and/or coordinating care.     CHRISTINE Herrmann  07/27/18   12:35 PM

## 2018-07-30 NOTE — PROGRESS NOTES
Debbiekevin Kushal  : 3/29/1940  Age 69year old  male patient is admitted to [facility] DOUG  18 for rehab and strengthening     Admitted to [facility] AND [facility] on: 18 to 18    Chief complaint:  Syncope and collapse ,Hyponatremia,Contusion of fa Had a right knee injection for pain control which he reports didn't help.    Patient denies chest pain or SOB.  Lungs are CTA, abdomen  With large hernia ,  soft non tender with +BS. + BM. +RUE is elevated pillow, pt had some swelling in RUE and BLE, cont o (two) times daily. Disp: 60 tablet Rfl: 6   metoprolol Tartrate 25 MG Oral Tab Take 1 tablet (25 mg total) by mouth 2 (two) times daily. Disp: 60 tablet Rfl: 11   Magnesium Chloride ER (MAG64) 535 (64 MG) MG Oral Tab CR Take 535 mg by mouth once daily.  Dis from eyes  HENT: normocephalic; normal nose, no nasal drainage, mucous membranes pink, moist, pharynx no exudate, no visible cerumen.   NECK: supple; FROM; no JVD, no TMG, no carotid bruits  BREAST: no masses, no nipple discharge, no nodes; normal skin  RES pillow     5. Hyponatremia  -Improved with tolvaptan.   - In the setting of etoh use, dehydration, ?beer potomania versus SIADH?  -No improvement with IV hydration   -Serum and urine osm's suggesting SIADH  -Renal on consult at [address] [place] [address], top consult in rehab and THEY ARE JUST NOT UNDERSTANDING, plan for dc 8/3/18- SW to be in contact with them again today     This is a 15 minute visit and greater than 50% of the time was spent counseling the patient and/or coordinating care.     CHRISTINE Garcia  07/30/18   11:13

## 2018-08-03 PROBLEM — J18.9 HOSPITAL-ACQUIRED PNEUMONIA: Status: ACTIVE | Noted: 2018-01-01
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
## 2018-08-03 NOTE — PROGRESS NOTES
Jaida York  : 3/29/1940  Age 69year old  male patient is admitted to [facility] 6  18 for rehab and strengthening     Admitted to [facility] AND [facility] on:  18 to 18    Chief complaint: Closed displaced fracture of surgical neck of righ due to not participating and making no progress, family care plans have occurred weekly but no conclusion on where pt was to go upon discharge.  Patient unsafe to leave alone unless 24 hr caregiver or LTC, care was extended to care for abrasions on buttock once daily. Disp: 30 tablet Rfl: 1   Multiple Vitamins-Minerals (CENTRUM SILVER ULTRA MENS) Oral Tab Take  by mouth. Disp:  Rfl:    Calcium Carb-Cholecalciferol (CALCIUM + D3) 600-200 MG-UNIT Oral Tab Take  by mouth.  Disp:  Rfl:    aspirin 81 MG Oral Tab T no nipple discharge, no nodes; normal skin  RESPIRATORY:new wet cough, rales throughout , increased SOB, given a stat neb tx and placed on a rebreather , pulsox increased from 84 % to 97%  CARDIOVASCULAR: S1, S2 normal, RRR; no S3, no S4;   ABDOMEN:  mary lou bmp  -Elevate right arm and use ice packs as needed , presently sling off and elevated on pillow   3. Alcohol abuse  -Counseled patient to quit while at [address] [place] he drinks 3-4 beers a day  Cont thiamine and folic acid    3. FCK  -Evident on UA at [facility]

## 2018-08-03 NOTE — H&P
[facility]    PATIENT'S NAME: Hawa    ATTENDING PHYSICIAN: Dagoberto Miller MD   PATIENT ACCOUNT#:   [id number]    LOCATION:  [place] [address]  MEDICAL RECORD #:   [id number]       YOB: 1940  ADMISSION DATE:       08/03/2 Also, he had some dysuria today. He has been lying in bed most of the time and not able to participate in physical therapy. Family noted that he had a small decubitus ulcer on his sacrum that started developing around 2 weeks ago.   Other 12-point review

## 2018-08-03 NOTE — ED INITIAL ASSESSMENT (HPI)
Pt with SOB that started last night worsened today. Normally not on O2. Received with 15L per NRB. On 2L here and 95%. No distress noted.

## 2018-08-03 NOTE — ED PROVIDER NOTES
Patient Seen in: [facility] AND [facility] Emergency Department    History   Patient presents with:  Dyspnea FRANCOISE SOB (respiratory)    Stated Complaint: SOB started last night worsened today.      HPI    Pt is 70 yo M from [place] who p/w sob, chest tightness an BMI 25.06 kg/m²         Physical Exam    GENERAL: No acute distress, awake and alert  HEENT: MMM, EOMI, PERRL  Neck: supple, non tender, no jvd  CV: RRR, no murmurs  Resp: +rhonchi with crackles b/l  Ab: soft, mild distension.  nontender  Extremities: FROM Pulse Readings from Last 1 Encounters:  08/03/18 : 89  , sinus     Radiology findings: Xr Chest Ap Portable  (cpt=71045)    Result Date: 8/3/2018  CONCLUSION:  1. Mild cardiomegaly. Tortuous thoracic aorta. 2. Moderate-Large retrocardiac hiatal hernia.  Renee Langston

## 2018-08-04 NOTE — PLAN OF CARE
Problem: Patient Centered Care  Goal: Patient preferences are identified and integrated in the patient's plan of care  Interventions:  - What would you like us to know as we care for you?  \"I reside in the nursing home because I'm weak\"  - Provide timely, Provide Smoking Cessation handout, if applicable  - Encourage broncho-pulmonary hygiene including cough, deep breathe, Incentive Spirometry  - Assess the need for suctioning and perform as needed  - Assess and instruct to report SOB or any respiratory diff injury  INTERVENTIONS:  - Assess pt frequently for physical needs  - Identify cognitive and physical deficits and behaviors that affect risk of falls.   - [place] fall precautions as indicated by assessment.  - Educate pt/family on patient safety includin

## 2018-08-04 NOTE — PLAN OF CARE
Problem: Patient/Family Goals  Goal: Patient/Family Long Term Goal  Patient's Long Term Goal: To be discharged    Interventions:  - Administer meds as prescribed  - See additional Care Plan goals for specific interventions   Outcome: Progressing    Goal: P integrity  - Assess and document dressing/incision, wound bed, drain sites and surrounding tissue  - Implement wound care per orders  - Initiate isolation precautions as appropriate  - Initiate Pressure Ulcer prevention bundle as indicated  Outcome: Soham Frequent rounding done.

## 2018-08-04 NOTE — OCCUPATIONAL THERAPY NOTE
OCCUPATIONAL THERAPY EVALUATION - INPATIENT     Room Number: 557/557-A  Evaluation Date: 8/4/2018  Type of Evaluation: Initial  Presenting Problem: Pneumonia, UTI    Physician Order: IP Consult to Occupational Therapy  Reason for Therapy: ADL/IADL Dysfunct blood clots     found in Lt leg during a stress test   • Right wrist fracture 2013    per [employer]:    • Unspecified essential hypertension        Past Surgical History  Past Surgical History:  No date: HERNIA SURGERY  No date: OTHER SURGICAL HISTORY      C brushing teeth?: A Little  -   Eating meals?: A Little    AM-PAC Score:  Score: 14  Approx Degree of Impairment: 59.67%  Standardized Score (AM-PAC Scale): 33.39  CMS Modifier (G-Code): CK    FUNCTIONAL TRANSFER ASSESSMENT  Supine to Sit : Maximum assistan

## 2018-08-04 NOTE — SLP NOTE
ADULT SWALLOWING EVALUATION    ASSESSMENT    ASSESSMENT/OVERALL IMPRESSION:  Pt seen for a bedside swallowing evaluation secondary to worsening CXR. Pt was admitted with diagnosis of pneumonia.   The pt is currently on a general solid diet with thin liquid Educated pt on swallowing precautions. Collaborated swallow plan of care with RN. RN to obtain any new orders. Per DEANNA NOMS functional communication measures, pt's swallow level is 5/7.          RECOMMENDATIONS   Diet Recommendations - Solids: Regular EXAMINATION  Dentition: Upper dentures; Lower dentures  Symmetry: Within Functional Limits  Strength:  Within Functional Limits  Tone: Within Functional Limits  Range of Motion: Within Functional Limits  Rate of Motion: Reduced    Voice Quality: Clear  Respi across 2 sessions. In Progress     FOLLOW UP  Treatment Plan/Recommendations: Dysphagia therapy; Aspiration precautions  Number of Visits to Meet Established Goals: 3  Follow Up Needed: Yes       Thank you for your referral.   If you have any questions,

## 2018-08-04 NOTE — CM/SW NOTE
+BPCI Readmission   Patient is a [address] readmission previously enrolled on 6/28/2018 under  with 52 days left in the BPCI episode which will end on 9/25/2018.

## 2018-08-04 NOTE — PROGRESS NOTES
[facility]    Progress Note    Sergio Russell Patient Status:  Inpatient    3/29/1940 MRN [id number]   Location [facility] 5SW/SE Attending Alisson Heaton MD   Hosp Day # 1 PCP Rahat Lr MD       Subjective:   Rodolfo Howard 5,000 Units, 5,000 Units, Subcutaneous, 2 times per day  •  acetaminophen (TYLENOL) tab 650 mg, 650 mg, Oral, Q6H PRN  •  ondansetron HCl (ZOFRAN) injection 4 mg, 4 mg, Intravenous, Q6H PRN  •  Piperacillin Sod-Tazobactam So (ZOSYN) 3.375 g in dextrose 5 % basilar consolidation. Small right effusion. Suspect pneumonitis.  4.  Mild left perihilar/basilar airspace opacification    Dictated by (CST): Lopez Johnson MD on 8/03/2018 at 16:35     Approved by (CST): Lopez Johnson MD on 8/03/2018 at 16:44

## 2018-08-04 NOTE — PHYSICAL THERAPY NOTE
PHYSICAL THERAPY EVALUATION - INPATIENT     Room Number: 557/557-A  Evaluation Date: 8/4/2018  Type of Evaluation: Initial   Physician Order: PT Eval and Treat    Presenting Problem: R lower lobe PNA and possible UTI  Reason for Therapy: Mobility Dysfunct training;Balance training  Rehab Potential : Good  Frequency (Obs): 5x/week       PHYSICAL THERAPY MEDICAL/SOCIAL HISTORY     History related to current admission: The patient is a 67-year-old  male with musculoskeletal deconditioning and peripher High fall risk    WEIGHT BEARING RESTRICTION  Weight Bearing Restriction: None                PAIN ASSESSMENT  Rating: Unable to rate  Location: R UE  movement along elbow and shoulder       COGNITION  · Overall Cognitive Status:  WFL - within functional l training  Strengthening  Transfer training    Patient End of Session: Up in chair;Needs met;Call light within reach;RN aware of session/findings; All patient questions and concerns addressed; Alarm set    CURRENT GOALS    Goals to be met by: 8/11/18  Patient

## 2018-08-05 NOTE — PLAN OF CARE
Problem: Patient Centered Care  Goal: Patient preferences are identified and integrated in the patient's plan of care  Interventions:  - What would you like us to know as we care for you?  \"I reside in the nursing home because I'm weak\"  - Provide timely, breathe, Incentive Spirometry  - Assess the need for suctioning and perform as needed  - Assess and instruct to report SOB or any respiratory difficulty  - Respiratory Therapy support as indicated  - Manage/alleviate anxiety  - Monitor for signs/symptoms o behaviors that affect risk of falls.   - [place] fall precautions as indicated by assessment.  - Educate pt/family on patient safety including physical limitations  - Instruct pt to call for assistance with activity based on assessment  - Modify environme

## 2018-08-05 NOTE — CONSULTS
[address] [facility]  Cardiology Consultation    Steven Nagel Location: [facility] 5SW/SE    3/29/1940 MRN [id number]   Consulting Date 2018 [facility] [id number]   Consulting Physician Emily Denson EF 40-45%)    Stress test:  8/2017= EF 68% and normal perfusion    History:  Past Medical History:   Diagnosis Date   • Calculus of kidney    • H/O blood clots     found in Lt leg during a stress test   • Right wrist fracture 2013    per [employer]:    • Unsp tablet (50 mg total) by mouth every 4 (four) hours as needed. Disp: 20 tablet Rfl: 0   B Complex-C (TOTAL B/C) Oral Tab Take 1 tablet by mouth 2 (two) times daily.  Disp: 30 tablet Rfl: 0       Review of Systems:  GENERAL: no fevers, chills, sweats  HEENT: rhythm. Will only use aspirin for now due to anemia. If arrhythmias are persistent anticoagulation options may need to be readdressed.   In addition agree with dose of Lasix to see if this can help with some of the lower extremity edema and recheck BMP in

## 2018-08-05 NOTE — HISTORICAL OFFICE NOTE
Ginger Jacqui  : 1940  ACCOUNT:  [id number]  [phone]  PCP: Dr. Alisha Peres     TODAY'S DATE: 2018  DICTATED BY:  [Dr. Olvera Market: [Followup of Aortic regurgitation, Followup of CAD, of native vessels, nonobstructive CAD.  SOCIAL HISTORY: SMOKING: Never used tobacco. denies smoking. CAFFEINE: 1 cup coffee daily. ALCOHOL: drinks 1 per day. EXERCISE: no regular exercise. DIET: no special diet. MARITAL STATUS: . OCCUPATION: retired.      ALLERGIES: No Known Allergie PACs,PVC's, NSVT-stable asymptomatic, continues beta-blocker  6. Recent weight loss-patient was instructed to follow-up with PCP at this time further workup and treatment  7.   Low-cholesterol-follow-up with PCP for further workup and evaluation      PLAN: cousin with him as his power of  and she also helped take notes to help remember things. NYHA Class: 1    RISK FACTORS:  CAD - Hypertension    REVIEW OF SYSTEMS: CONS: no fever, chills, or recent weight changes.  EYES: denies significant visual up to 6 beats, asymptomatic. Both on a treadmill and Holter. He is on a beta-blocker with normal EF and normal stress test.  No heart failure. He is not high risk for sudden cardiac death and he needs no further testing or treatment.   Continue with beta Pulmonary arteries: PA peak pressure: 32 mm Hg (S).   · Frequent ectopy

## 2018-08-05 NOTE — PROGRESS NOTES
[facility]    Progress Note    Ayanna Fatima Patient Status:  Inpatient    3/29/1940 MRN [id number]   Location [facility] 5SW/SE Attending Sheron Robbins MD   Hosp Day # 2 PCP Mary Escalante MD       Subjective:   Jimmy Lombardi Normal Saline Flush 0.9 % injection 3 mL, 3 mL, Intravenous, PRN  •  Heparin Sodium (Porcine) 5000 UNIT/ML injection 5,000 Units, 5,000 Units, Subcutaneous, 2 times per day  •  acetaminophen (TYLENOL) tab 650 mg, 650 mg, Oral, Q6H PRN  •  ondansetron HCl ( 4.0   CL  101  102  103   CO2  23  21*  22         Imaging:   Xr Chest Ap Portable  (cpt=71045)    Result Date: 8/3/2018  CONCLUSION:  1. Mild cardiomegaly. Tortuous thoracic aorta. 2. Moderate-Large retrocardiac hiatal hernia.  Surgical clips posterior me

## 2018-08-05 NOTE — PLAN OF CARE
Problem: Patient Centered Care  Goal: Patient preferences are identified and integrated in the patient's plan of care  Interventions:  - What would you like us to know as we care for you?  \"I reside in the nursing home because I'm weak\"  - Provide timely, breathe, Incentive Spirometry  - Assess the need for suctioning and perform as needed  - Assess and instruct to report SOB or any respiratory difficulty  - Respiratory Therapy support as indicated  - Manage/alleviate anxiety  - Monitor for signs/symptoms o behaviors that affect risk of falls.   - [place] fall precautions as indicated by assessment.  - Educate pt/family on patient safety including physical limitations  - Instruct pt to call for assistance with activity based on assessment  - Modify environme

## 2018-08-05 NOTE — CM/SW NOTE
JAVIER completed chart review from snf CHRISTINE/Rhona who stated a careplan meeting was held to discuss longterm care needs. Pt had been discharged from therapy at the snf due to inability to cooperate.  A dc date was arranged on 8/2 and there had apparently been 2

## 2018-08-06 NOTE — PLAN OF CARE
Problem: Patient Centered Care  Goal: Patient preferences are identified and integrated in the patient's plan of care  Interventions:  - What would you like us to know as we care for you?  \"I reside in the nursing home because I'm weak\"  - Provide timely, breathe, Incentive Spirometry  - Assess the need for suctioning and perform as needed  - Assess and instruct to report SOB or any respiratory difficulty  - Respiratory Therapy support as indicated  - Manage/alleviate anxiety  - Monitor for signs/symptoms o behaviors that affect risk of falls.   - [place] fall precautions as indicated by assessment.  - Educate pt/family on patient safety including physical limitations  - Instruct pt to call for assistance with activity based on assessment  - Modify environme

## 2018-08-06 NOTE — PROGRESS NOTES
[facility]  Cardiology Progress Note    Ad Shearer Patient Status:  Inpatient    3/29/1940 MRN [id number]   Location [facility] 5SW/SE Attending Gemma Woodson MD   Hosp Day # 3 PCP Maddie Payne MD       Assessment and decreased BS bases  Abdomen: Soft,  Extremities: mild edema.    Neurologic: Alert   Skin: Warm      Laboratory/Data:    Labs:         Recent Labs   Lab  08/03/18   1628  08/04/18   0519  08/05/18   0558  08/06/18   0552   WBC  6.9  5.7  5.5  5.6   HGB  8.9* 650 mg 650 mg Oral Q6H PRN   Piperacillin Sod-Tazobactam So (ZOSYN) 3.375 g in dextrose 5 % 100 mL ADD-vantage 3.375 g Intravenous Q8H   albuterol sulfate (VENTOLIN) (2.5 MG/3ML) 0.083% nebulizer solution 2.5 mg 2.5 mg Nebulization Q6H PRN   guaiFENesin (R

## 2018-08-06 NOTE — WOUND PROGRESS NOTE
Pt seen for wound consult for \"sacral decub\". Pt was turned, bilateral buttocks, sacral area assessed. The skin is pink, blanchable and intact. Patient's skin was assessed and the right arm has a small tear/scab, a bandaid was applied.  Bilateral heels ar

## 2018-08-06 NOTE — PLAN OF CARE
Problem: Patient Centered Care  Goal: Patient preferences are identified and integrated in the patient's plan of care  Interventions:  - What would you like us to know as we care for you?  \"I reside in the nursing home because I'm weak\"  - Provide timely, breathe, Incentive Spirometry  - Assess the need for suctioning and perform as needed  - Assess and instruct to report SOB or any respiratory difficulty  - Respiratory Therapy support as indicated  - Manage/alleviate anxiety  - Monitor for signs/symptoms o behaviors that affect risk of falls.   - [place] fall precautions as indicated by assessment.  - Educate pt/family on patient safety including physical limitations  - Instruct pt to call for assistance with activity based on assessment  - Modify environme

## 2018-08-06 NOTE — OCCUPATIONAL THERAPY NOTE
OCCUPATIONAL THERAPY TREATMENT NOTE - INPATIENT        Room Number: 557/557-A           Presenting Problem: Pneumoni, UTI    Problem List  Principal Problem:    Hospital-acquired pneumonia      ASSESSMENT   Pt seen for OT treatment this AM.  Pt received up 59.67%  Standardized Score (AM-PAC Scale): 33.39  CMS Modifier (G-Code): CK    FUNCTIONAL TRANSFER ASSESSMENT  Supine to Sit : Not tested (pt received up in chair)  Sit to Stand: Not tested (pt declined attempt today)  Toilet Transfer: not tested this sess

## 2018-08-06 NOTE — PHYSICAL THERAPY NOTE
PHYSICAL THERAPY TREATMENT NOTE - INPATIENT     Room Number: 557/557-A       Presenting Problem: R lower lobe PNA and possible UTI    Problem List  Principal Problem:    Hospital-acquired pneumonia      ASSESSMENT   Pt seen daily. Family present;family educ Score: 86.62%   Standardized Score (AM-PAC Scale): 28.58   CMS Modifier (G-Code): CM    FUNCTIONAL ABILITY STATUS  Gait Assessment   Gait Assistance: Maximum assistance  Distance (ft): SPT  Assistive Device: Rolling walker  Pattern: Shuffle     Comment : P

## 2018-08-06 NOTE — PROGRESS NOTES
[facility]    Progress Note    Ayanna Fatima Patient Status:  Inpatient    3/29/1940 MRN [id number]   Location [facility] 5SW/SE Attending Sheron Robbins MD   Hosp Day # 3 PCP Mary Escalante MD       Subjective:   Jimmy Lombardi 50 mg, 50 mg, Oral, Q4H PRN  •  Normal Saline Flush 0.9 % injection 3 mL, 3 mL, Intravenous, PRN  •  Heparin Sodium (Porcine) 5000 UNIT/ML injection 5,000 Units, 5,000 Units, Subcutaneous, 2 times per day  •  acetaminophen (TYLENOL) tab 650 mg, 650 mg, Ora 7. 8*  7.7*  7.6*   NA  130*  132*  129*   K  4.0  4.0  3.7   CL  102  103  101   CO2  21*  22  22         Imaging:               ZENY REYNOLDS

## 2018-08-06 NOTE — CM/SW NOTE
JAVIER contacted [facility] who stated that pt's [facility] was [facility] and [address] for rehab. Per [facility], pt was accepted at [facility] for LTC, but family does not want LTC at this time.  JAVIER sent tentative

## 2018-08-07 NOTE — SLP NOTE
SPEECH DAILY NOTE - INPATIENT    ASSESSMENT & PLAN   ASSESSMENT  Pt seen for swallowing therapy to monitor swallowing tolerance and train swallowing precautions per BSE recommendations.   Collaborated with RN, whom reports pt does not like the thicken liqui rate;Small bites and sips; No straw  Medication Administration Recommendations: Whole in puree    Patient Experiencing Pain: No                Discharge Recommendations  Discharge Recommendations/Plan: [address] dry swallows, keep head in neutral position. No straws were used during this session.   In Progress     FOLLOW UP  Follow Up Needed: Yes  SLP Follow-up Date: 08/08/18  Number of Visits to Meet Established Goals: 3    Session: 1 post BSE    If you have any

## 2018-08-07 NOTE — CM/SW NOTE
Sw left VM for pt's relative to discuss d/c plans. SW to follow up when appropriate.     Maia Grover, [address]

## 2018-08-07 NOTE — PLAN OF CARE
Problem: Patient Centered Care  Goal: Patient preferences are identified and integrated in the patient's plan of care  Interventions:  - What would you like us to know as we care for you?  \"I reside in the nursing home because I'm weak\"  - Provide timely, breathe, Incentive Spirometry  - Assess the need for suctioning and perform as needed  - Assess and instruct to report SOB or any respiratory difficulty  - Respiratory Therapy support as indicated  - Manage/alleviate anxiety  - Monitor for signs/symptoms o behaviors that affect risk of falls.   - [place] fall precautions as indicated by assessment.  - Educate pt/family on patient safety including physical limitations  - Instruct pt to call for assistance with activity based on assessment  - Modify environme

## 2018-08-07 NOTE — DIETARY NOTE
ADULT NUTRITION INITIAL ASSESSMENT    Pt is at moderate nutrition risk. Pt meets malnutrition criteria.       RECOMMENDATIONS TO MD:  See Nutrition Intervention for care plans    CRITERIA FOR MALNUTRITION DIAGNOSIS:  Criteria for non-severe malnutrition di assistance: meal set up  - Coordination of nutrition care: collaboration with other providers  - Discharge and transfer of nutrition care to new setting or provider:  monitor plans    ADMITTING DIAGNOSIS: Hospital-acquired pneumonia [J18.9]   PERTINENT PAS Body Fat/Muscle Mass: moderate depletion body fat orbital region and triceps region and moderate depletion muscle mass clavicle region, scapular region, shoulder region and dorsal pollard region   - Fluid Accumulation: lower extremity edema +2  - Skin Integ

## 2018-08-07 NOTE — PROGRESS NOTES
[facility]    Progress Note    Angel Fernandez Patient Status:  Inpatient    3/29/1940 MRN [id number]   Location [facility] 5SW/SE Attending Melani Fernandes MD   Hosp Day # 4 PCP Marguerite Parra MD       Subjective:   Jose Manuel Kasper Sodium (PROTONIX) EC tab 40 mg, 40 mg, Oral, QAM AC  •  Sertraline HCl (ZOLOFT) tab 25 mg, 25 mg, Oral, Nightly  •  TraMADol HCl (ULTRAM) tab 50 mg, 50 mg, Oral, Q4H PRN  •  Normal Saline Flush 0.9 % injection 3 mL, 3 mL, Intravenous, PRN  •  acetaminophen 16.5*   WBC  5.5  5.6  7.4   PLT  183  155  146         Recent Labs   Lab  08/05/18   0558  08/06/18   0552  08/07/18   0450   GLU  105*  94  97   BUN  28*  25*  32*   CREATSERUM  0.99  1.03  1.12   GFRAA  >60  >60  >60   GFRNAA  >60  >60  >60   CA  7.7*

## 2018-08-07 NOTE — PLAN OF CARE
Problem: Patient Centered Care  Goal: Patient preferences are identified and integrated in the patient's plan of care  Interventions:  - What would you like us to know as we care for you?  \"I reside in the nursing home because I'm weak\"  - Provide timely, breathe, Incentive Spirometry  - Assess the need for suctioning and perform as needed  - Assess and instruct to report SOB or any respiratory difficulty  - Respiratory Therapy support as indicated  - Manage/alleviate anxiety  - Monitor for signs/symptoms o physical deficits and behaviors that affect risk of falls.   - [place] fall precautions as indicated by assessment.  - Educate pt/family on patient safety including physical limitations  - Instruct pt to call for assistance with activity based on assessme

## 2018-08-07 NOTE — PROGRESS NOTES
[facility]    Progress Note    Orestes Evangelista Patient Status:  Inpatient    3/29/1940 MRN [id number]   Location [facility] 5SW/SE Attending Elvis Carrington MD   Hosp Day # 4 PCP Zaira Robins MD       Assessment and Plan: rhythm, nl W3,W9 , systolic murmur  Abd: Abdomen soft, nontender, hernia present  Ext:  no clubbing, no cyanosis, pitting JIMMY  Neuro: no focal deficits  Skin: no rashes or lesions    Scheduled Meds:   • phytonadione (VITAMIN K) IVPB  10 mg Intravenous Once

## 2018-08-07 NOTE — PHYSICAL THERAPY NOTE
PHYSICAL THERAPY TREATMENT NOTE - INPATIENT     Room Number: 557/557-A       Presenting Problem: R lower lobe PNA and possible UTI    Problem List  Principal Problem:    Hospital-acquired pneumonia      ASSESSMENT   Patient in bed agree for therapy activit Turning over in bed (including adjusting bedclothes, sheets and blankets)?: A Lot   -   Sitting down on and standing up from a chair with arms (e.g., wheelchair, bedside commode, etc.): A Lot   -   Moving from lying on back to sitting on the side of the b Goal #6    Goal #6  Current Status

## 2018-08-08 NOTE — PROGRESS NOTES
[facility]  Cardiology Progress Note    Aura Cullen Patient Status:  Inpatient    3/29/1940 MRN [id number]   Location [facility] 5SW/SE Attending Eber Ramirez,  [facility] Day # 5 PCP Xavier Reynaga MD       Assessment and Pl 160 lb (72.6 kg)  08/03/18 1527 : 155 lb 9.6 oz (70.6 kg)  07/30/18 1110 : 155 lb 9.6 oz (70.6 kg)      Tele: NSR    Physical Exam:    General: Alert and oriented No apparent distress. No respiratory or constitutional distress.   HEENT: Normocephalic,Neck: Daily   folic acid (FOLVITE) tab 1 mg 1 mg Oral Daily   magnesium gluconate (MAGONATE) tab 500 mg Oral Daily   multivitamin with minerals (ADULT) tab 1 tablet 1 tablet Oral Daily   Pantoprazole Sodium (PROTONIX) EC tab 40 mg 40 mg Oral QAM AC   Sertraline

## 2018-08-08 NOTE — CONSULTS
[facility]    PATIENT'S NAME: Sharon Romero   ATTENDING PHYSICIAN: Angel Menjivar MD   CONSULTING PHYSICIAN: Gloris Meckel.  Kori Joshua MD   PATIENT ACCOUNT#:   [id number]    LOCATION:  [address] [place] [address] RECORD #:   [id number]       DATE OF BIRTH: multivitamin, Protonix, sertraline, B complex. ALLERGIES:  Venofer. SOCIAL HISTORY:  The patient denies any alcohol, tobacco, or illicit drug use. He does have a previous history of fairly heavy alcohol use.     FAMILY HISTORY:  No history of any he 19:02:09  t: 08/07/2018 21:25:12  [facility] [id number]  [facility]/

## 2018-08-08 NOTE — PROGRESS NOTES
[facility]    Hematology/Oncology   Progress Note    Nurismagda Siegel Patient Status:  Inpatient    3/29/1940 MRN [id number]   Location [facility] 5SW/SE Attending Hayes Latham MD   Hosp Day # 5 PCP MD Kaya Petit Diag Img (jui=[id number])    Result Date: 8/8/2018  CONCLUSION:  1. No DVT. 2. Soft tissue swelling. Dictated by (CST): Da aYn MD on 8/08/2018 at 14:11     Approved by (CST):  Da Yan MD on 8/08/2018 at 14:13          Xr Video Swallow (c

## 2018-08-08 NOTE — PROGRESS NOTES
[place] [facility]    EP Cardiology Progress Note    Deirdre Spore Patient Status:  Inpatient    3/29/1940 MRN [id number]   Location [facility] 5SW/SE Attending Aviva Christine MD   Hosp Day # 5 PCP Agustina Kearney MD         Assessment TP 5.0 (L) 06/19/2018   AST 20 06/19/2018   ALT 13 (L) 06/19/2018   PTT 40.8 (H) 08/08/2018   INR 1.6 (H) 08/08/2018   T4F 0.97 09/22/2015   TSH 2.26 06/22/2018   DDIMER 1.79 (H) 06/19/2018   MG 1.9 08/07/2018   PHOS 3.3 06/27/2018   TROP 0.03 08/03/2018

## 2018-08-08 NOTE — PROGRESS NOTES
[place] [facility] - [facility]    Progress Note    Magnolia Rejijaison Patient Status:  Inpatient    3/29/1940 MRN [id number]   Location [facility] 5SW/SE Attending Jono Jones, [facility] Day # 5 PCP Maciej Michel MD       Subjective:     Breathing contrast media in the esophagus may be related to reflux or less likely dysmotility.      Dictated by (CST): Elouise Pallas, MD on 8/08/2018 at 9:08     Approved by (CST): Elouise Pallas, MD on 8/08/2018 at 9:15          Us Venous Doppler Leg Bilat - Diag Img

## 2018-08-08 NOTE — PLAN OF CARE
CARDIOVASCULAR - ADULT    • Absence of cardiac arrhythmias or at baseline Progressing        PAIN - ADULT    • Verbalizes/displays adequate comfort level or patient's stated pain goal Progressing        Patient Centered Care    • Patient preferences are id

## 2018-08-08 NOTE — CM/SW NOTE
[facility] JAVIER contacted [facility] - reviewing referral    Pt has 65 Medicare days left. JAVIER talked w/ POA/Megan who is also requesting referral to [facility].  JAVIER received MDO for POLST and discussed

## 2018-08-08 NOTE — PLAN OF CARE
Problem: Patient Centered Care  Goal: Patient preferences are identified and integrated in the patient's plan of care  Interventions:  - What would you like us to know as we care for you?  \"I reside in the nursing home because I'm weak\"  - Provide timely, breathe, Incentive Spirometry  - Assess the need for suctioning and perform as needed  - Assess and instruct to report SOB or any respiratory difficulty  - Respiratory Therapy support as indicated  - Manage/alleviate anxiety  - Monitor for signs/symptoms o

## 2018-08-08 NOTE — OCCUPATIONAL THERAPY NOTE
OCCUPATIONAL THERAPY TREATMENT NOTE - INPATIENT        Room Number: 557/557-A           Presenting Problem: Pneumoni, UTI    Problem List  Principal Problem:    Hospital-acquired pneumonia  Active Problems:    Pneumonia due to infectious organism    Coagul None                PAIN ASSESSMENT  Rating: 3  Location: stomach and R heel  Management Techniques: Relaxation;Repositioning     ACTIVITY TOLERANCE  O2 Saturation: 94%  Room air  Shortness of breath    ACTIVITIES OF DAILY LIVING ASSESSMENT  AM-PAC ‘6-Clic

## 2018-08-09 NOTE — OCCUPATIONAL THERAPY NOTE
OCCUPATIONAL THERAPY TREATMENT NOTE - INPATIENT        Room Number: 557/557-A  Presenting Problem: Pneumoni, UTI    Problem List  Principal Problem:    Hospital-acquired pneumonia  Active Problems:    Pneumonia due to infectious organism    Coagulopathy (H TOLERANCE  fair    ACTIVITIES OF DAILY LIVING ASSESSMENT  AM-PAC ‘6-Clicks’ Inpatient Daily Activity Short Form  How much help from another person does the patient currently need…  -   Putting on and taking off regular lower body clothing?: A Lot  -   [facility]

## 2018-08-09 NOTE — SLP NOTE
SPEECH DAILY NOTE - INPATIENT    ASSESSMENT & PLAN   ASSESSMENT  Pt seen for meal assessment and training of swallow compensatory strategies per recommendations of VFSS on 8/8/18. Pt denies any difficulties swallowing. Pt c/o of reflux following meals.  SLP consistencies;Small bites and sips;Multiple swallows  Aspiration Precautions: Upright position; Slow rate;Small bites and sips; No straw  Medication Administration Recommendations: Crushed in puree    Patient Experiencing Pain: No    Discharge Recommendation Handouts left at bedside for additional exercises   In Progress        FOLLOW UP  Follow Up Needed: Yes  SLP Follow-up Date: 08/09/18  Number of Visits to Meet Established Goals: 3    Session: 1 following VFSS     If you have any questions, please contac

## 2018-08-09 NOTE — PHYSICAL THERAPY NOTE
PHYSICAL THERAPY TREATMENT NOTE - INPATIENT     Room Number: 557/557-A       Presenting Problem: R lower lobe PNA and possible UTI    Problem List  Principal Problem:    Hospital-acquired pneumonia  Active Problems:    Pneumonia due to infectious organism Dependent    ACTIVITY TOLERANCE  Room air  No shortness of breath    AM-PAC '6-Clicks' INPATIENT SHORT FORM - BASIC MOBILITY  How much difficulty does the patient currently have. ..  -   Turning over in bed (including adjusting bedclothes, sheets and blanke Current Status    Goal #5 Patient to demonstrate independence with home activity/exercise instructions provided to patient in preparation for discharge.    Goal #5   Current Status In progress   Goal #6    Goal #6  Current Status

## 2018-08-09 NOTE — PROGRESS NOTES
[facility]  Cardiology Progress Note    Michelle Del Castillo Patient Status:  Inpatient    3/29/1940 MRN [id number]   Location [facility] 5SW/SE Attending Jsoe Mark, [facility] Day # 6 PCP Montserrat Frederick MD       Assessment and Pl Exam:    General: Alert and oriented No apparent distress. No respiratory or constitutional distress. HEENT: Normocephalic,. Neck: No JVD,   Cardiac: Regular rate and rhythm. S1, S2 normal.  Lungs: Clear decreased BS basesAbdomen: Soft, Extremities:  With Oral Daily   Pantoprazole Sodium (PROTONIX) EC tab 40 mg 40 mg Oral QAM AC   Sertraline HCl (ZOLOFT) tab 25 mg 25 mg Oral Nightly   TraMADol HCl (ULTRAM) tab 50 mg 50 mg Oral Q4H PRN   Normal Saline Flush 0.9 % injection 3 mL 3 mL Intravenous PRN   acetami

## 2018-08-09 NOTE — TRANSITION NOTE
History of Present Illness:  Howie Rodriguez is a 69year old with a history of hyponatremia, idiopathic dilated cardiomyopathy, nonobstructive coronary artery disease, hypertension, valvular disease, nonsustained V. Tach and PACs.   The patient was last h acid  1 mg Oral Daily   • Magnesium Gluconate  500 mg Oral Daily   • multivitamin with minerals  1 tablet Oral Daily   • Pantoprazole Sodium  40 mg Oral QAM AC   • Sertraline HCl  25 mg Oral Nightly   • piperacillin-tazobactam  3.375 g Intravenous Q8H   •

## 2018-08-09 NOTE — PROGRESS NOTES
[place] [facility] - [facility]    Progress Note    Danikagreg Muellermik Patient Status:  Inpatient    3/29/1940 MRN [id number]   Location [facility] 5SW/SE Attending Krystian Arita,  [facility] Day # 6 PCP Kasi Arredondo MD       Subjective:     Pt feels w reflux or less likely dysmotility.      Dictated by (CST): Lise White MD on 8/08/2018 at 9:08     Approved by (CST): Lise White MD on 8/08/2018 at 9:15          Us Venous Doppler Leg Bilat - Diag Img (cpt=93970)    Result Date: 8/8/2018  CONCLUSION:

## 2018-08-10 NOTE — OCCUPATIONAL THERAPY NOTE
OCCUPATIONAL THERAPY TREATMENT NOTE - INPATIENT        Room Number: 557/557-A           Presenting Problem: Pneumoni, UTI    Problem List  Principal Problem:    Hospital-acquired pneumonia  Active Problems:    Pneumonia due to infectious organism    Coagul 33.39  CMS Modifier (G-Code): CK    FUNCTIONAL TRANSFER ASSESSMENT  Supine to Sit : Not tested  Sit to Stand: Maximum assistance (pt up in chair with lift)  Toilet Transfer: Nt  Shower Transfer: NT  Chair Transfer: pt was up in chair with use of overhead l

## 2018-08-10 NOTE — PLAN OF CARE
Problem: Patient Centered Care  Goal: Patient preferences are identified and integrated in the patient's plan of care  Interventions:  - What would you like us to know as we care for you?  \"I reside in the nursing home because I'm weak\"  - Provide timely, breathe, Incentive Spirometry  - Assess the need for suctioning and perform as needed  - Assess and instruct to report SOB or any respiratory difficulty  - Respiratory Therapy support as indicated  - Manage/alleviate anxiety  - Monitor for signs/symptoms o Progressing  Fall precautions in place, frequent rounding done by rn and pct. Comments: s/p 1 unit of PRBC's given on shift. Re-check hgb: 8.7. No signs/symptoms of transfusion reaction noted.

## 2018-08-10 NOTE — PROGRESS NOTES
[facility]    Hematology/Oncology   Progress Note    Magnolia Hutchins Patient Status:  Inpatient    3/29/1940 MRN [id number]   Location [facility] 5SW/SE Attending Jono Jones MD   Hosp Day # 6 PCP MD Mariza Smith Date: 8/8/2018  CONCLUSION:  1. No DVT. 2. Soft tissue swelling. Dictated by (CST): Malcolm Sheets MD on 8/08/2018 at 14:11     Approved by (CST):  Malcolm Sheets MD on 8/08/2018 at 14:13          Xr Video Swallow (cpt=[id number])    Result Date: 8/8/2

## 2018-08-10 NOTE — PROGRESS NOTES
[place] [facility] - [facility]    Progress Note    Revonda Box Patient Status:  Inpatient    3/29/1940 MRN [id number]   Location [facility] 5SW/SE Attending Dilan Martinez,  [facility]  Day # 7 PCP Elzbieta Lay MD       Subjective:     Legs less past.  Cont free water restriction. Monitor BMP.     SVT  Brief events on tele  2d echo obtained and reviewed, with mildly reduced EF 45-50%, hypokineses of anteroseptal myocardium, grade 1 DD  Cardiology and EP consulted.   - cont metoprolol  - off tele

## 2018-08-10 NOTE — PROGRESS NOTES
[facility]    Hematology/Oncology   Progress Note    Steven Nagel Patient Status:  Inpatient    3/29/1940 MRN [id number]   Location [facility] 5SW/SE Attending Haley Suero MD   Hosp Day # 7 PCP MD Matthew Lewis Date: 8/8/2018  CONCLUSION:  1. No DVT. 2. Soft tissue swelling. Dictated by (CST): Anderson Sanchez MD on 8/08/2018 at 14:11     Approved by (CST):  Anderson Sanchez MD on 8/08/2018 at 14:13          Xr Video Swallow (cpt=[id number])    Result Date: 8/8/2

## 2018-08-11 NOTE — PLAN OF CARE
Problem: Patient Centered Care  Goal: Patient preferences are identified and integrated in the patient's plan of care  Interventions:  - What would you like us to know as we care for you?  \"I reside in the nursing home because I'm weak\"  - Provide timely, broncho-pulmonary hygiene including cough, deep breathe, Incentive Spirometry  - Assess the need for suctioning and perform as needed  - Assess and instruct to report SOB or any respiratory difficulty  - Respiratory Therapy support as indicated  - Manage/a schedule   Outcome: Adequate for Discharge      Comments: Patient will be going to [facility] today. Report given to [facility]. Patient will be going by ambulance, all belongings packed. IV removed. Pickup time is 1600.

## 2018-08-11 NOTE — PROGRESS NOTES
[address] [facility]    CDI Prediction Tool Protocol (Vancomycin Initiated)    OVP (oral vancomycin prophylaxis) 125 mg PO Daily is being started in this patient based on a score of 14.       Score Breakdown:  High risk antibiotic use (5 points)  Hospital

## 2018-08-11 NOTE — PLAN OF CARE
Problem: Patient Centered Care  Goal: Patient preferences are identified and integrated in the patient's plan of care  Interventions:  - What would you like us to know as we care for you?  \"I reside in the nursing home because I'm weak\"  - Provide timely, saturation or ABGs  - Provide Smoking Cessation handout, if applicable  - Encourage broncho-pulmonary hygiene including cough, deep breathe, Incentive Spirometry  - Assess the need for suctioning and perform as needed  - Assess and instruct to report SOB o with activity based on assessment  - Modify environment to reduce risk of injury  - Provide assistive devices as appropriate  - Consider OT/PT consult to assist with strengthening/mobility  - Encourage toileting schedule   Outcome: Progressing  Frequent ro

## 2018-08-11 NOTE — CM/SW NOTE
JAVIER informed by RN that pt is medically stable for discharge today at 4:00pm. JAVIER spoke with [facility] [phone] who confirmed that they are able to accept pt into room 1448 at that time.  Pt aware and agreeable with d/c plan and ambulance

## 2018-08-12 NOTE — DISCHARGE SUMMARY
[place] [facility] - [facility]    Discharge Summary    Revonda Box Patient Status:  Inpatient    3/29/1940 MRN [id number]   Location [facility] 5SW/SE Attending No att. providers found   [facility] Day # 8 PCP Elzbieta Lay MD     Date of Admission: 8.9.  MCV is normal.  His sodium was 129 and calculated osmolality of 273, which is around his baseline. Troponin was 0.03. Chest x-ray showed right lower lobe infiltrate. The patient was started on IV Zosyn. Urinalysis still pending.   The patient will tablet  Refills:  0     furosemide 20 MG Tabs  Commonly known as:  LASIX  Start taking on:  8/12/2018      Take 1 tablet (20 mg total) by mouth every other day. Hold lasix if SBP <110.    Quantity:  30 tablet  Refills:  0     guaiFENesin 100 MG/5ML Soln  Co Quantity:  60 tablet  Refills:  11     Nystatin Powd      Apply 100,000 Units topically 2 (two) times daily.    Refills:  0     Pantoprazole Sodium 40 MG Tbec  Commonly known as:  PROTONIX      Take 1 tablet (40 mg total) by mouth every morning before break information:  [id number] [facility].     Why:  Patient's POA declined available appointments, pt will f/u with doctors at [facility] inform

## 2018-08-15 NOTE — TELEPHONE ENCOUNTER
Called Terri Thomas back and informed her pt has not had a pneumonia vaccine, states she will inform nursing home so they can give him vaccine

## 2019-02-28 VITALS
DIASTOLIC BLOOD PRESSURE: 71 MMHG | WEIGHT: 161 LBS | HEART RATE: 64 BPM | RESPIRATION RATE: 18 BRPM | HEIGHT: 67 IN | SYSTOLIC BLOOD PRESSURE: 116 MMHG | OXYGEN SATURATION: 99 % | BODY MASS INDEX: 25.27 KG/M2

## 2019-02-28 VITALS
SYSTOLIC BLOOD PRESSURE: 135 MMHG | RESPIRATION RATE: 18 BRPM | BODY MASS INDEX: 24.99 KG/M2 | WEIGHT: 150 LBS | DIASTOLIC BLOOD PRESSURE: 80 MMHG | HEART RATE: 68 BPM | OXYGEN SATURATION: 96 % | HEIGHT: 65 IN

## 2019-02-28 VITALS
HEIGHT: 67 IN | HEART RATE: 63 BPM | WEIGHT: 160 LBS | DIASTOLIC BLOOD PRESSURE: 65 MMHG | BODY MASS INDEX: 25.11 KG/M2 | RESPIRATION RATE: 18 BRPM | SYSTOLIC BLOOD PRESSURE: 117 MMHG

## 2019-02-28 VITALS
HEART RATE: 119 BPM | RESPIRATION RATE: 22 BRPM | DIASTOLIC BLOOD PRESSURE: 82 MMHG | SYSTOLIC BLOOD PRESSURE: 112 MMHG | BODY MASS INDEX: 25.99 KG/M2 | OXYGEN SATURATION: 97 % | HEIGHT: 65 IN | WEIGHT: 156 LBS

## 2019-03-01 VITALS
WEIGHT: 163 LBS | SYSTOLIC BLOOD PRESSURE: 120 MMHG | HEIGHT: 67 IN | BODY MASS INDEX: 25.58 KG/M2 | DIASTOLIC BLOOD PRESSURE: 62 MMHG | RESPIRATION RATE: 16 BRPM | HEART RATE: 63 BPM

## 2021-12-06 NOTE — CM/SW NOTE
Care Management/BPCI:    Met with patient at bedside to explain the BPCI/Medicare program. Patient agreed with phone f/u for 3 months from [facility] after discharge from [facility]. Patient was enrolled under .  BPCI/Medicare Letter and Brochure Recommended lid scrubs qid 1 week then q week.

## 2023-12-28 NOTE — PROGRESS NOTES
Left message on daughter voice mail informing daughter if she still need to speak with someone please return the call. [facility]    Progress Note    Dawit Monge Patient Status:  Inpatient    3/29/1940 MRN [id number]   Location [facility] 4W/SW/SE Attending Benita Longoria MD   Hosp Day # 7 PCP Isra Sanchez MD       Subjective:     Patient deferring endoscopy at this time, would like to pursue as OP     Large ventral Abd hernia  Was told that does not need to get this fixed unless he starts having pain  Denies any pain currently  Cont to monitor  OP gen surg eval     R humeral neck fracture

## (undated) NOTE — IP AVS SNAPSHOT
[facility]            (For Outpatient Use Only) Initial Admit Date: 6/17/2018   Inpt/Obs Admit Date: Inpt: 6/19/18 / Obs: 06/17/18   Discharge Date:    Agustina Arias:  [de-identified]   MRN: [de-identified]   CSN: [id number]        [facility] Subscriber ID:  Pt Rel to Subscriber:    Hospital Account Financial Class: Medicare    June 28, 2018

## (undated) NOTE — LETTER
Hospital Discharge Documentation  Pt was dc brittany Ansari    From: [address] Hospitalist's Office  Phone: [phone]    Patient discharged time/date: 8/11/2018  4:19 PM  Patient discharge disposition:  [facility] Gen:   NAD.  A and O x 3  CV:   RRR, no m/g/r  Pulm:   CTA bilat  Abd:   +bs, soft, NT, ND, large umbilical hernia, no bleeding  LE:   trace edema bilat  Neuro:   Generally weak      Reason for Admission:    Right lower lobe pneumonia and possible urinary dvt proph:   heparin     Code status: [facility]    Consultations:   Hematology, cardiology    Discharge Condition:  Good    Discharge Medications:      Discharge Medications      START taking these medications      Instructions Prescription details   acetamino Take 1 tablet by mouth daily. Refills:  0     docusate sodium 100 MG Caps  Commonly known as:  COLACE      Take 100 mg by mouth 2 (two) times daily.    Refills:  0     folic acid 1 MG Tabs  Commonly known as:  FOLVITE      Take 1 tablet (1 mg total) by m · ipratropium-albuterol 0.5-2.5 (3) MG/3ML Soln  · Vancomycin HCl 50 MG/ML Solr     Please  your prescriptions at the location directed by your doctor or nurse    Bring a paper prescription for each of these medications  · Amoxicillin-Pot Clavulanat

## (undated) NOTE — IP AVS SNAPSHOT
Patient Demographics     Address  [address] Phone  [phone] (Home) *Preferred*  [phone] [facility])      Emergency Contact(s)     Name Relation Home Work [facility] OTHER [phone]  Amira James Next dose due:  As needed every 4 hours      Take 1 tablet (50 mg total) by mouth every 4 (four) hours as needed.    Evangelist Faye MD               Where to Get Your Medications      Please  your prescriptions at the location directed by your docto Order ID Medication Name Action Time Action Reason Comments    [id number] Heparin Sodium (Porcine) 5000 UNIT/ML injection 5,000 Units 06/28/18 0512 Given            LEFT UPPER ABDOMEN     Order ID Medication Name Action Time Action Reason Comments    [id number] Creatinine 0.74 0.50 - 1.50 mg/dL — [facility]   Calcium, Total 8.5 8.5 - 10.5 mg/dL [facility]   BUN/CREA Ratio 32.4 10.0 - 20.0 [facility]   Anion Gap 5 0 - 18 mmol/L — [facility]   Calculated Osmolality 276 275 - 295 mOsm/kg [employer] Serjioberny Niki Patient Status:  Emergency    3/29/1940 MRN [id number]   Location [address] Attending Jayden Koenig MD   Hosp Day # 0 PCP Yanira Powers MD     Date:  2018  Date of Admission:  2018    History Allergies: No Known Allergies    (Not in a hospital admission)    Review of Systems:     A comprehensive 12 point review of systems was completed. Pertinent positives and negatives noted in the the HPI.     Physical Exam:   Vital signs:[RS.1] Blood pressur acromioclavicular joint injury. 3. Calcific tendinitis of the right rotator cuff. 4. Demineralization. 5. Chronic/healed right lateral sixth rib fracture.      Dictated by (CST): Socorro Frederick MD on 6/17/2018 at 18:06     Approved by (CST): Socorro Frederick well over half time in face-to-face discussion of further evaluation and therapy. [RS.3]    Awilda Carrion MD  6/17/2018[RS.1]    Electronically signed by Sheron Robbins MD on 6/17/2018  8:47 PM   Attribution [employer]    RS. 1 - Sheron Robbins MD on fibrillation. He does drink about 4 to 6 drinks a day often at the [employer] in [place].  He lives alone and really cannot take care of himself too well.   He denies having a low sodium in the past, but we are currently consulted for a low sodi not get him up to walk. Cranial nerves without any deficits. LABORATORY DATA:  CBC:  Currently, white count 7 hemoglobin 9.7, hematocrit 28, platelets 966,922, differential unremarkable. Procalcitonin is negative.   Urine culture so far appears negativ because of his alcohol intake and watching for any DTs. His blood pressure appears stable on current medications. He is currently off his losartan and we will hold that at the present time if blood pressure remains stable.   I will discuss with Dr. Shaq Johns improved speed and steadyness with small jer with chair follow for safety. Pt with decreased jer and step length. Pt with sling donned at all time and was able to maintain NWB status throughout the therapy session.   Pt limited with mobility today Standardized Score (AM-PAC Scale): 39.45   CMS Modifier (G-Code): CK    FUNCTIONAL ABILITY STATUS  Gait Assessment   Gait Assistance: Minimum assistance  Distance (ft): 30  Assistive Device:  (QC)  Pattern: Shuffle  Stoop/Curb Assistance: Not tested  [employer] Closed displaced fracture of surgical neck of right humerus, unspecified fracture morphology, initial encounter  Active Problems:    Syncope    Contusion of face, initial encounter    Abrasion of face, initial encounter    Blunt head injury, initial enco blankets)?: A Lot   -   Sitting down on and standing up from a chair with arms (e.g., wheelchair, bedside commode, etc.): A Lot   -   Moving from lying on back to sitting on the side of the bed?: A Lot   How much help from another person does the patient c Physical Therapy Note signed by Elaina Cabrera PTA at 6/26/2018  2:06 PM  Version 1 of 1    Author:  Elaina Cabrera PTA Service:  (none) Author Type:  Physical Therapist    Filed:  6/26/2018  2:06 PM Date of Service:  6/26/2018 11:06 AM St PAIN ASSESSMENT   Ratin  Location: left groin/RUE  Management Techniques: Activity promotion; Body mechanics    BALANCE                                                                                                                     Static Sitting: G Current Status Mod A x 2 with sit<>stand transfers from the chair     Goal #3 Patient is able to ambulate 150 feet with assist device: cane - quad at assistance level: modified independent   Goal #3   Current Status Amb 50 ft w/ [facility] & Min A and chair foll unsteadiness during AMB. Returned pt to the room and to sitting in the chair with all needs within reach. Pt is on track to dc to rehab once medically cleared. [RM.3]     DISCHARGE RECOMMENDATIONS[RM.1]  PT Discharge Recommendations: Cont skilled therapy in Standardized Score (AM-PAC Scale): 40.78   CMS Modifier (G-Code): CK[RM.2]    FUNCTIONAL ABILITY STATUS  Gait Assessment[RM. 1]   Gait Assistance: Minimum assistance  Distance (ft): 100'  Assistive Device: Other (Comment) (QC)  Pattern: Shuffle  Stoop/Curb Filed:  6/27/2018 11:55 AM Date of Service:  6/27/2018 11:45 AM Status:  Signed    :  Kinjal Shaw (Occupational Therapy Assistant)       OCCUPATIONAL THERAPY TREATMENT NOTE - INPATIENT    Room Number: 522/522-A         Presenting Problem: s/p fa Precautions: Limb alert - right;Bed/chair alarm    WEIGHT BEARING RESTRICTION  Weight Bearing Restriction: R upper extremity  R Upper Extremity: Non-Weight Bearing             PAIN ASSESSMENT  Ratin  Location:  (RUE)  Management Techniques:  Activity pr Patient will complete toilet transfer Mod I   Comment:  Min A     Patient will complete LE dressing Mod I   Comment:mod assist    Patient will complete toileting Mod I   Comment:  Mod A             Goals  on: 18  Frequency: 3-5x week (OBS)[BA.1] saturating 86% on RA; when cued for breathing, patient improved very quickly to >90%; however, even when patient was saturating 100%, he was still experiencing SOB; HR with activities 77; BP stable; patient encouraged to lie in bed with RUE in more support CMS Modifier (G-Code): CK    FUNCTIONAL TRANSFER ASSESSMENT  Supine to Sit : Moderate assistance  Sit to Stand:  Moderate assistance  Toilet Transfer: Completed in standing with CGA   Shower Transfer: NT  Chair Transfer: NT  Car Transfer: NT    [employer]

## (undated) NOTE — IP AVS SNAPSHOT
Patient Demographics     Address  [address] Phone  [phone] [facility])  [phone] (Mobile) *Preferred*      Emergency Contact(s)     Name Relation Home Work [address] [place] [address] [phone]  Amira James Lanre Duncan MD         furosemide 20 MG Tabs  Commonly known as:  LASIX  Start taking on:  8/12/2018  Next dose due:  8/12/2018      Take 1 tablet (20 mg total) by mouth every other day. Hold lasix if SBP <110.    Galileo Ott MD         guaiFENesin Take 1 tablet (50 mg total) by mouth every 4 (four) hours as needed.    Nadege Tipton MD         Vancomycin HCl 50 MG/ML Solr  Commonly known as:  FIRVANQ  Start taking on:  8/12/2018  Next dose due:  8/12/2018      Take 2.5 mL (125 mg total) by mouth da [id number] Vancomycin HCl [facility]) 50 MG/ML oral solution 125 mg 08/11/18 0920 Given      [id number] aspirin EC tab 81 mg 08/11/18 0919 Given      [id number] folic acid (FOLVITE) tab 1 mg 08/11/18 0920 Given      [id number] ipratropium-albuterol (DUONEB) neb Please view results for these tests on the individual orders.     Specimen Information    Type Source Collected On   Blood — 08/11/18 0609            BASIC METABOLIC PANEL (8) [[id number]] (Abnormal)  Resulted: 08/11/18 8061, Result status: Final result   Or Blood Culture Result No Growth 5 Days    Urine Culture, Routine Once [[id number]]  (Abnormal)  (Susceptibility) Collected:  08/03/18 2138    Order Status:  Completed Lab Status:  Final result Updated:  08/05/18 0921    Specimen:  Urine from Urine, clean c started on IV Zosyn. Urinalysis still pending. The patient will be admitted to the hospital for further management.      PAST MEDICAL HISTORY:  The patient has history of hypertension, peripheral neuropathy, nonobstructive coronary artery disease, nephrol ABDOMEN:  Soft, nondistended. Ventral hernia, with no incarceration. EXTREMITIES:  Edema, 1+, of both legs. No clubbing or cyanosis. NEUROLOGIC:  Decreased sensation to light touch and defective proprioception in both feet.   Otherwise, no focal defec pneumonia, being evaluated by Hematology for coagulopathy. The patient was given prophylactic heparin during this admission, and he had continued bleeding from this injection site.   Evaluation included coagulation testing today, 08/07/2018, where his INR LUNGS:  Symmetric expansion. Nonlabored breathing. HEART:  Good distal pulses. ABDOMEN:  Soft, nontender, nondistended. No masses, no organomegaly. EXTREMITIES:  No edema. SKIN:  No visible or palpable lesions.     LYMPHATICS:  No palpable peripheral Signed    :  Marcos Mai PT (Physical Therapist)       PHYSICAL THERAPY TREATMENT NOTE - INPATIENT     Room Number: 557/557-A       Presenting Problem: R lower lobe PNA and possible UTI    Problem List  Principal Problem:    Hospital-acqui Static Sitting: Fair  Dynamic Sitting: Fair -           Static Standing: Poor  Dynamic Standing: Dependent    ACTIVITY TOLERANCE  Room air  No shortness of breath    AM-PAC '6-Clicks' INPATIENT SHORT FORM - Goal #3 Patient is able to ambulate 10 feet with assist device: walker - rolling at assistance level: maximum assistance   Goal #3   Current Status SPT bed to chair with Max a   Goal #4    Goal #4   Current Status    Goal #5 Patient to demonstrate independ PLAN[BA. 1]  OT Treatment Plan: Balance activities; Functional transfer training;UE strengthening/ROM; Endurance training[BA. 2]    SUBJECTIVE  Pt seen up in chair and agreeable for OT session    OBJECTIVE[BA.1]  Precautions: Bed/chair alarm[BA. 2]    WEIGHT BE Patient End of Session: Up in chair;Needs met;Call light within reach[BA.2]    OT Goals:       Patient will complete functional transfer with Moderate assist x 1  Comment: Pt currently requires max a x 2 for sit to stand    Patient will complete toileting pursued within available range rue. Pt encouraged to incorporate rue in hand to mouth tasks at meals to encourage more functional movement. Pt currently requires max a to don socks from long sit in chair.  At end of session pt returned to chair and left w/ Sit to Stand: Maximum assistance (x2)[FABIOLA.2]  Chair Transfer: max a x 2    Bedroom Mobility: limited to static standing    BALANCE ASSESSMENT  Static Sitting: independent  Dynamic Sitting: supervision  Static Standing: max a x 2;additional assist x 1  Dynam RN provided consent to proceed with treatment; pt rcvd in bed, indicated need for toileting, setup with urinal; patient able to manage once setup while supine in bed; once completed, discussed patient goals for therapy; pt is known to this therapist from a AM-PAC ‘6-Clicks’ Inpatient Daily Activity Short Form  How much help from another person does the patient currently need…  -   Putting on and taking off regular lower body clothing?: A Lot  -   Bathing (including washing, rinsing, drying)?: A Lot  -   Toil Filed:  8/8/2018  3:17 PM Date of Service:  8/8/2018  3:12 PM Status:  Signed    :  Huber Stephenson OT (Occupational Therapist)    Related Notes:  Addendum by Huber Stephenson OT (Occupational Therapist) filed at 8/8/2018  3:18 PM       OCCUPATIONAL THER training;Patient/Family education;Patient/Family training; Compensatory technique education    SUBJECTIVE  \"Nathalie had tests all day, I am so tired\"    OBJECTIVE  Precautions: Bed/chair alarm    WEIGHT BEARING RESTRICTION  Weight Bearing Restriction: None Goals  on: 18  Frequency: 3-5 x/week    Norris Burrell, OTR/L   Occupational Therapy   [place] Jesus. 1]           Attribution [employer]    AA. 1 - Minoo Burrell, OT on 2018  3:12 PM                     Video Swallow Study Notes 1. Bibasilar pleural effusions with compressive atelectasis in both lower lobes. A coexistent basilar pneumonia should be excluded clinically. No significant changes. 2. Stable mild cardiomegaly. 3. Atherosclerotic calcification aorta.   4. Old bilateral Goal #3 The patient will utilize compensatory strategies as outlined by  VFSS including Slow rate, Small bites, Small sips, Multiple swallows, Alternate liquids/solids, No straws, Upright 90 degrees, Upright 90 degrees 30 mins after meal, Chin lamin with mi

## (undated) NOTE — LETTER
Hospital Discharge Documentation  Pt was dc to [facility]    From: [address] Hospitalist's Office  Phone: [phone]    Patient discharged time/date: 6/28/2018  2:53 PM  Patient discharge disposition:  SNF       Discharge Summaries - Steven Nagel is a(n) 69year old male with no significant PMH presents s/p fall. Of note pt drank 2 beers, 2 shots while watching tv earlier today. He then decided to go to his car on the driveway and then tripped on something and fell.  He landed face fo Na improved to 131  Appreciate nephro recs.     Severe malnutrition  Says he is hungry and he is about to eat today.   Patient has no appetite   After his wife  he states he does not feel hungry  Nutrition consult      Alcohol abuse  Counseled patient t Quantity:  60 tablet  Refills:  11           Where to Get Your Medications      Please  your prescriptions at the location directed by your doctor or nurse    Bring a paper prescription for each of these medications  · folic acid 1 MG Tabs  · [place]